# Patient Record
Sex: FEMALE | Race: BLACK OR AFRICAN AMERICAN | Employment: FULL TIME | URBAN - METROPOLITAN AREA
[De-identification: names, ages, dates, MRNs, and addresses within clinical notes are randomized per-mention and may not be internally consistent; named-entity substitution may affect disease eponyms.]

---

## 2018-09-29 ENCOUNTER — HOSPITAL ENCOUNTER (EMERGENCY)
Age: 33
Discharge: HOME | End: 2018-09-29
Payer: MEDICARE

## 2018-09-29 ENCOUNTER — APPOINTMENT (OUTPATIENT)
Dept: CT IMAGING | Age: 33
End: 2018-09-29
Payer: MEDICARE

## 2018-09-29 VITALS
SYSTOLIC BLOOD PRESSURE: 133 MMHG | DIASTOLIC BLOOD PRESSURE: 79 MMHG | RESPIRATION RATE: 16 BRPM | TEMPERATURE: 98.9 F | BODY MASS INDEX: 45.49 KG/M2 | WEIGHT: 265 LBS | OXYGEN SATURATION: 99 % | HEART RATE: 70 BPM

## 2018-09-29 DIAGNOSIS — R10.9 LEFT SIDED ABDOMINAL PAIN: Primary | ICD-10-CM

## 2018-09-29 DIAGNOSIS — R31.9 HEMATURIA, UNSPECIFIED TYPE: ICD-10-CM

## 2018-09-29 LAB
A/G RATIO: 1.2 (ref 1.1–2.2)
ALBUMIN SERPL-MCNC: 4 G/DL (ref 3.4–5)
ALP BLD-CCNC: 74 U/L (ref 40–129)
ALT SERPL-CCNC: 12 U/L (ref 10–40)
ANION GAP SERPL CALCULATED.3IONS-SCNC: 12 MMOL/L (ref 3–16)
AST SERPL-CCNC: 16 U/L (ref 15–37)
BASOPHILS ABSOLUTE: 0 K/UL (ref 0–0.2)
BASOPHILS RELATIVE PERCENT: 0.5 %
BILIRUB SERPL-MCNC: <0.2 MG/DL (ref 0–1)
BILIRUBIN URINE: NEGATIVE
BLOOD, URINE: ABNORMAL
BUN BLDV-MCNC: 11 MG/DL (ref 7–20)
CALCIUM SERPL-MCNC: 9.3 MG/DL (ref 8.3–10.6)
CHLORIDE BLD-SCNC: 102 MMOL/L (ref 99–110)
CLARITY: CLEAR
CO2: 23 MMOL/L (ref 21–32)
COLOR: YELLOW
CREAT SERPL-MCNC: 0.7 MG/DL (ref 0.6–1.1)
EOSINOPHILS ABSOLUTE: 0.2 K/UL (ref 0–0.6)
EOSINOPHILS RELATIVE PERCENT: 2.8 %
EPITHELIAL CELLS, UA: 3 /HPF (ref 0–5)
GFR AFRICAN AMERICAN: >60
GFR NON-AFRICAN AMERICAN: >60
GLOBULIN: 3.4 G/DL
GLUCOSE BLD-MCNC: 86 MG/DL (ref 70–99)
GLUCOSE URINE: NEGATIVE MG/DL
GONADOTROPIN, CHORIONIC (HCG) QUANT: <5 MIU/ML
HCT VFR BLD CALC: 40.6 % (ref 36–48)
HEMOGLOBIN: 13.1 G/DL (ref 12–16)
HYALINE CASTS: 0 /LPF (ref 0–8)
KETONES, URINE: NEGATIVE MG/DL
LEUKOCYTE ESTERASE, URINE: NEGATIVE
LIPASE: 31 U/L (ref 13–60)
LYMPHOCYTES ABSOLUTE: 1.8 K/UL (ref 1–5.1)
LYMPHOCYTES RELATIVE PERCENT: 27.8 %
MCH RBC QN AUTO: 29.1 PG (ref 26–34)
MCHC RBC AUTO-ENTMCNC: 32.3 G/DL (ref 31–36)
MCV RBC AUTO: 90.1 FL (ref 80–100)
MICROSCOPIC EXAMINATION: YES
MONOCYTES ABSOLUTE: 0.8 K/UL (ref 0–1.3)
MONOCYTES RELATIVE PERCENT: 11.9 %
NEUTROPHILS ABSOLUTE: 3.7 K/UL (ref 1.7–7.7)
NEUTROPHILS RELATIVE PERCENT: 57 %
NITRITE, URINE: NEGATIVE
PDW BLD-RTO: 13.6 % (ref 12.4–15.4)
PH UA: 7
PLATELET # BLD: 243 K/UL (ref 135–450)
PMV BLD AUTO: 8.6 FL (ref 5–10.5)
POTASSIUM SERPL-SCNC: 4.5 MMOL/L (ref 3.5–5.1)
PROTEIN UA: NEGATIVE MG/DL
RBC # BLD: 4.5 M/UL (ref 4–5.2)
RBC UA: 10 /HPF (ref 0–4)
SODIUM BLD-SCNC: 137 MMOL/L (ref 136–145)
SPECIFIC GRAVITY UA: >1.03
TOTAL PROTEIN: 7.4 G/DL (ref 6.4–8.2)
URINE REFLEX TO CULTURE: ABNORMAL
URINE TYPE: ABNORMAL
UROBILINOGEN, URINE: 1 E.U./DL
WBC # BLD: 6.5 K/UL (ref 4–11)
WBC UA: 1 /HPF (ref 0–5)

## 2018-09-29 PROCEDURE — 80053 COMPREHEN METABOLIC PANEL: CPT

## 2018-09-29 PROCEDURE — 2580000003 HC RX 258: Performed by: PHYSICIAN ASSISTANT

## 2018-09-29 PROCEDURE — 84702 CHORIONIC GONADOTROPIN TEST: CPT

## 2018-09-29 PROCEDURE — 6370000000 HC RX 637 (ALT 250 FOR IP): Performed by: PHYSICIAN ASSISTANT

## 2018-09-29 PROCEDURE — 6360000004 HC RX CONTRAST MEDICATION: Performed by: PHYSICIAN ASSISTANT

## 2018-09-29 PROCEDURE — 74177 CT ABD & PELVIS W/CONTRAST: CPT

## 2018-09-29 PROCEDURE — 99284 EMERGENCY DEPT VISIT MOD MDM: CPT

## 2018-09-29 PROCEDURE — 96375 TX/PRO/DX INJ NEW DRUG ADDON: CPT

## 2018-09-29 PROCEDURE — 85025 COMPLETE CBC W/AUTO DIFF WBC: CPT

## 2018-09-29 PROCEDURE — 36415 COLL VENOUS BLD VENIPUNCTURE: CPT

## 2018-09-29 PROCEDURE — 6360000002 HC RX W HCPCS: Performed by: PHYSICIAN ASSISTANT

## 2018-09-29 PROCEDURE — 81001 URINALYSIS AUTO W/SCOPE: CPT

## 2018-09-29 PROCEDURE — 96374 THER/PROPH/DIAG INJ IV PUSH: CPT

## 2018-09-29 PROCEDURE — 83690 ASSAY OF LIPASE: CPT

## 2018-09-29 RX ORDER — ONDANSETRON 4 MG/1
4 TABLET, FILM COATED ORAL EVERY 8 HOURS PRN
Qty: 15 TABLET | Refills: 0 | Status: SHIPPED | OUTPATIENT
Start: 2018-09-29 | End: 2020-01-12 | Stop reason: ALTCHOICE

## 2018-09-29 RX ORDER — ONDANSETRON 2 MG/ML
4 INJECTION INTRAMUSCULAR; INTRAVENOUS ONCE
Status: COMPLETED | OUTPATIENT
Start: 2018-09-29 | End: 2018-09-29

## 2018-09-29 RX ORDER — DICYCLOMINE HYDROCHLORIDE 10 MG/1
10 CAPSULE ORAL ONCE
Status: COMPLETED | OUTPATIENT
Start: 2018-09-29 | End: 2018-09-29

## 2018-09-29 RX ORDER — DICYCLOMINE HYDROCHLORIDE 10 MG/1
10 CAPSULE ORAL 4 TIMES DAILY PRN
Qty: 20 CAPSULE | Refills: 0 | Status: SHIPPED | OUTPATIENT
Start: 2018-09-29 | End: 2020-01-12

## 2018-09-29 RX ORDER — KETOROLAC TROMETHAMINE 30 MG/ML
30 INJECTION, SOLUTION INTRAMUSCULAR; INTRAVENOUS ONCE
Status: COMPLETED | OUTPATIENT
Start: 2018-09-29 | End: 2018-09-29

## 2018-09-29 RX ORDER — 0.9 % SODIUM CHLORIDE 0.9 %
1000 INTRAVENOUS SOLUTION INTRAVENOUS ONCE
Status: COMPLETED | OUTPATIENT
Start: 2018-09-29 | End: 2018-09-29

## 2018-09-29 RX ADMIN — SODIUM CHLORIDE 1000 ML: 9 INJECTION, SOLUTION INTRAVENOUS at 18:28

## 2018-09-29 RX ADMIN — ONDANSETRON 4 MG: 2 INJECTION INTRAMUSCULAR; INTRAVENOUS at 18:28

## 2018-09-29 RX ADMIN — DICYCLOMINE HYDROCHLORIDE 10 MG: 10 CAPSULE ORAL at 18:33

## 2018-09-29 RX ADMIN — IOPAMIDOL 75 ML: 755 INJECTION, SOLUTION INTRAVENOUS at 17:59

## 2018-09-29 RX ADMIN — KETOROLAC TROMETHAMINE 30 MG: 30 INJECTION, SOLUTION INTRAMUSCULAR at 18:33

## 2018-09-29 ASSESSMENT — PAIN SCALES - GENERAL
PAINLEVEL_OUTOF10: 9
PAINLEVEL_OUTOF10: 7

## 2018-09-30 NOTE — ED PROVIDER NOTES
Triage Chief Complaint:   Abdominal Pain (Patient c/o pain on and off this week with nausea. patient also states left arm doesnt feel right and she feels dizzy when she bends over. )    [place]:  Ana María Gonsalez is a 28 y.o. female that presents with abdominal pain and nausea. Patient reports that symptoms have been going on for approximately one week. Seems to be intermittent and has gotten worse today. Pain is in the left upper quadrant and also the suprapubic region. Having nausea but no vomiting. Denies any urinary symptoms. Denies any recent travel or sick contacts. Has not taken any medication for her pain or other symptoms. Rates her pain as 7/10. Nothing seems to make it better or worse. Denies fever, chills, cough, shortness of breath, chest pain, vomiting, urinary symptoms, joint pain, numbness, tingling, weakness or associated symptoms. ROS:  At least 10 systems reviewed and otherwise negative except as in the [address] PAST MEDICAL HISTORY/SURGICAL HISTORY    Past Medical History:   Diagnosis Date    Anemia     taking iron    UTI (lower urinary tract infection)      Past Surgical History:   Procedure Laterality Date    ADENOIDECTOMY       SECTION      NRFHT    LEG SURGERY      bone taken from right hip to surgically repair fracture to RLE.    TONSILLECTOMY         CURRENT MEDICATIONS    Current Outpatient Rx   Medication Sig Dispense Refill    dicyclomine (BENTYL) 10 MG capsule Take 1 capsule by mouth 4 times daily as needed (abdominal pain) 20 capsule 0    ondansetron (ZOFRAN) 4 MG tablet Take 1 tablet by mouth every 8 hours as needed for Nausea 15 tablet 0    norgestimate-ethinyl estradiol (ORTHO-CYCLEN, 28,) 0.25-35 MG-MCG per tablet Take 1 tablet by mouth daily. 1 packet 3    ibuprofen (IBU) 800 MG tablet Take 1 tablet by mouth every 8 hours as needed for Pain. 120 tablet 3    ferrous sulfate (FE TABS) 325 (65 FE) MG EC tablet Take 1 tablet by mouth 2 times daily. Medication List as of 9/29/2018  8:08 PM      START taking these medications    Details   dicyclomine (BENTYL) 10 MG capsule Take 1 capsule by mouth 4 times daily as needed (abdominal pain), Disp-20 capsule, R-0Print      ondansetron (ZOFRAN) 4 MG tablet Take 1 tablet by mouth every 8 hours as needed for Nausea, Disp-15 tablet, R-0Print             (Please note that portions of this note may have been completed with a voice recognition program. Efforts were made to edit the dictations but occasionally words are mis-transcribed.)         Kaitlynn Marie PA-C  09/29/18 [id number]

## 2020-01-12 ENCOUNTER — HOSPITAL ENCOUNTER (EMERGENCY)
Age: 35
Discharge: HOME | End: 2020-01-12
Attending: EMERGENCY MEDICINE
Payer: COMMERCIAL

## 2020-01-12 ENCOUNTER — APPOINTMENT (OUTPATIENT)
Dept: GENERAL RADIOLOGY | Age: 35
End: 2020-01-12
Payer: COMMERCIAL

## 2020-01-12 VITALS
OXYGEN SATURATION: 99 % | HEART RATE: 98 BPM | BODY MASS INDEX: 45.24 KG/M2 | RESPIRATION RATE: 16 BRPM | DIASTOLIC BLOOD PRESSURE: 66 MMHG | SYSTOLIC BLOOD PRESSURE: 116 MMHG | TEMPERATURE: 97.9 F | WEIGHT: 265 LBS | HEIGHT: 64 IN

## 2020-01-12 PROCEDURE — 96375 TX/PRO/DX INJ NEW DRUG ADDON: CPT

## 2020-01-12 PROCEDURE — 71046 X-RAY EXAM CHEST 2 VIEWS: CPT

## 2020-01-12 PROCEDURE — 2580000003 HC RX 258: Performed by: PHYSICIAN ASSISTANT

## 2020-01-12 PROCEDURE — 99283 EMERGENCY DEPT VISIT LOW MDM: CPT

## 2020-01-12 PROCEDURE — 6360000002 HC RX W HCPCS: Performed by: PHYSICIAN ASSISTANT

## 2020-01-12 PROCEDURE — 96361 HYDRATE IV INFUSION ADD-ON: CPT

## 2020-01-12 PROCEDURE — 96374 THER/PROPH/DIAG INJ IV PUSH: CPT

## 2020-01-12 RX ORDER — DIPHENHYDRAMINE HYDROCHLORIDE 50 MG/ML
12.5 INJECTION INTRAMUSCULAR; INTRAVENOUS ONCE
Status: COMPLETED | OUTPATIENT
Start: 2020-01-12 | End: 2020-01-12

## 2020-01-12 RX ORDER — KETOROLAC TROMETHAMINE 30 MG/ML
30 INJECTION, SOLUTION INTRAMUSCULAR; INTRAVENOUS ONCE
Status: COMPLETED | OUTPATIENT
Start: 2020-01-12 | End: 2020-01-12

## 2020-01-12 RX ORDER — METOCLOPRAMIDE HYDROCHLORIDE 5 MG/ML
10 INJECTION INTRAMUSCULAR; INTRAVENOUS ONCE
Status: COMPLETED | OUTPATIENT
Start: 2020-01-12 | End: 2020-01-12

## 2020-01-12 RX ORDER — 0.9 % SODIUM CHLORIDE 0.9 %
1000 INTRAVENOUS SOLUTION INTRAVENOUS ONCE
Status: COMPLETED | OUTPATIENT
Start: 2020-01-12 | End: 2020-01-12

## 2020-01-12 RX ADMIN — METOCLOPRAMIDE 10 MG: 5 INJECTION, SOLUTION INTRAMUSCULAR; INTRAVENOUS at 09:27

## 2020-01-12 RX ADMIN — DIPHENHYDRAMINE HYDROCHLORIDE 12.5 MG: 50 INJECTION, SOLUTION INTRAMUSCULAR; INTRAVENOUS at 09:27

## 2020-01-12 RX ADMIN — KETOROLAC TROMETHAMINE 30 MG: 30 INJECTION, SOLUTION INTRAMUSCULAR at 09:28

## 2020-01-12 RX ADMIN — SODIUM CHLORIDE 1000 ML: 9 INJECTION, SOLUTION INTRAVENOUS at 09:27

## 2020-01-12 ASSESSMENT — PAIN DESCRIPTION - LOCATION: LOCATION: HEAD

## 2020-01-12 ASSESSMENT — PAIN SCALES - GENERAL
PAINLEVEL_OUTOF10: 5
PAINLEVEL_OUTOF10: 9

## 2020-01-12 ASSESSMENT — ENCOUNTER SYMPTOMS
COUGH: 1
ABDOMINAL PAIN: 0
DIARRHEA: 0
NAUSEA: 0
SHORTNESS OF BREATH: 0
VOMITING: 0
RHINORRHEA: 0

## 2020-01-12 ASSESSMENT — PAIN DESCRIPTION - PAIN TYPE: TYPE: ACUTE PAIN

## 2020-01-12 ASSESSMENT — PAIN DESCRIPTION - PROGRESSION: CLINICAL_PROGRESSION: GRADUALLY IMPROVING

## 2020-01-12 NOTE — LETTER
[facility] Emergency [facility]  [address] [place] [address], [address]             January 12, 2020    Patient: Harmony Chen   YOB: 1985   Date of Visit: 1/12/2020       To Whom It May Concern:    Qian Martin was seen and treated in our emergency department on 1/12/2020. She is excused from work 1/13/2020. May return 1/14/2020 without restrictions.       Sincerely,         Dr Derick Jarquin

## 2020-01-12 NOTE — ED PROVIDER NOTES
or any disagreements were addressed in the HPI. REVIEW OF SYSTEMS    (2-9 systems for level 4, 10 or more for level 5)     Review of Systems   Constitutional: Negative for activity change, appetite change, chills and fever. HENT: Positive for congestion. Negative for rhinorrhea. Eyes: Negative for visual disturbance. Respiratory: Positive for cough. Negative for shortness of breath. Cardiovascular: Negative for chest pain. Gastrointestinal: Negative for abdominal pain, diarrhea, nausea and vomiting. Genitourinary: Negative for difficulty urinating, dysuria and hematuria. Neurological: Positive for headaches. Negative for weakness and numbness. Positives and Pertinent negatives as per HPI. Except as noted above in the ROS, all other systems were reviewed and negative. PAST MEDICAL HISTORY     Past Medical History:   Diagnosis Date    Anemia     taking iron    UTI (lower urinary tract infection)          SURGICAL HISTORY     Past Surgical History:   Procedure Laterality Date    ADENOIDECTOMY       SECTION      NRFHT    LEG SURGERY      bone taken from right hip to surgically repair fracture to RLE.   [facility]       Discharge Medication List as of 2020 11:26 AM      CONTINUE these medications which have NOT CHANGED    Details   levonorgestrel (MIRENA, 52 MG,) IUD 52 mg 1 each by Intrauterine route once, Intrauterine, ONCE, Historical Med      ferrous sulfate (FE TABS) 325 (65 FE) MG EC tablet Take 1 tablet by mouth 2 times daily. , Disp-60 tablet, R-11               ALLERGIES     Patient has no known allergies.     FAMILYHISTORY       Family History   Problem Relation Age of Onset    Asthma Mother     Diabetes Maternal Aunt     High Blood Pressure Maternal Aunt     Diabetes Maternal Grandmother           SOCIAL HISTORY       Social History     Tobacco Use    Smoking status: Never Smoker    Smokeless tobacco: Never Used Substance Use Topics    Alcohol use: No     Comment: occas    Drug use: No       SCREENINGS             PHYSICAL EXAM    (up to 7 for level 4, 8 or more for level 5)     ED Triage Vitals [01/12/20 0831]   BP Temp Temp Source Pulse Resp SpO2 Height Weight   116/66 97.9 °F (36.6 °C) Oral 98 16 99 % 5' 4\" (1.626 m) 265 lb (120.2 kg)       Physical Exam  Vitals signs and nursing note reviewed. Constitutional:       Appearance: She is well-developed. She is not diaphoretic. HENT:      Head: Normocephalic and atraumatic. Right Ear: External ear normal.      Left Ear: External ear normal.      Nose: Nose normal.      Mouth/Throat:      Mouth: Mucous membranes are moist.      Pharynx: Oropharynx is clear. No posterior oropharyngeal erythema. Eyes:      General:         Right eye: No discharge. Left eye: No discharge. Extraocular Movements: Extraocular movements intact. Conjunctiva/sclera: Conjunctivae normal.      Pupils: Pupils are equal, round, and reactive to light. Neck:      Musculoskeletal: Normal range of motion and neck supple. Trachea: No tracheal deviation. Cardiovascular:      Rate and Rhythm: Normal rate and regular rhythm. Heart sounds: No murmur. Pulmonary:      Effort: Pulmonary effort is normal. No respiratory distress. Breath sounds: Normal breath sounds. No wheezing or rales. Abdominal:      General: Bowel sounds are normal. There is no distension. Palpations: Abdomen is soft. Tenderness: There is no tenderness. There is no guarding. Musculoskeletal: Normal range of motion. Skin:     General: Skin is warm and dry. Neurological:      Mental Status: She is alert and oriented to person, place, and time. GCS: GCS eye subscore is 4. GCS verbal subscore is 5. GCS motor subscore is 6. Cranial Nerves: Cranial nerves are intact. No cranial nerve deficit. Sensory: Sensation is intact. No sensory deficit.       Motor: Motor function is intact. No weakness or abnormal muscle tone. Coordination: Coordination is intact. Gait: Gait is intact. Psychiatric:         Behavior: Behavior normal.         DIAGNOSTIC RESULTS   LABS:    Labs Reviewed - No data to display    All other labs were within normal range or not returned as of this dictation. EKG: All EKG's are interpreted by the Emergency Department Physician in the absence of a cardiologist.  Please see their note for interpretation of EKG. RADIOLOGY:   Non-plain film images such as CT, Ultrasound and MRI are read by the radiologist. Plain radiographic images are visualized and preliminarily interpreted by the  ED Provider with the below findings:        Interpretation per the Radiologist below, if available at the time of this note:    XR CHEST STANDARD (2 VW)   Final Result   No evidence of acute cardiopulmonary disease. No results found. PROCEDURES   Unless otherwise noted below, none     Procedures    CRITICAL CARE TIME   N/A    CONSULTS:  None      EMERGENCY DEPARTMENT COURSE and DIFFERENTIAL DIAGNOSIS/MDM:   Vitals:    Vitals:    01/12/20 0831   BP: 116/66   Pulse: 98   Resp: 16   Temp: 97.9 °F (36.6 °C)   TempSrc: Oral   SpO2: 99%   Weight: 265 lb (120.2 kg)   Height: 5' 4\" (1.626 m)       Patient was given thefollowing medications:  Medications   0.9 % sodium chloride bolus (0 mLs Intravenous Stopped 1/12/20 1026)   ketorolac (TORADOL) injection 30 mg (30 mg Intravenous Given 1/12/20 6376)   metoclopramide (REGLAN) injection 10 mg (10 mg Intravenous Given 1/12/20 5921)   diphenhydrAMINE (BENADRYL) injection 12.5 mg (12.5 mg Intravenous Given 1/12/20 2719)       The patient presents to the emergency department today for evaluation for a headache. The patient states that she had a gradual onset of a throbbing frontal headache 3 days ago. The patient states that her headache has been constant since that time. She is currently rating her pain as a 9/10. effusion, pneumothorax, acute restorative stress or other emergent etiology      FINAL IMPRESSION      1. Acute nonintractable headache, unspecified headache type    2.  Acute upper respiratory infection          DISPOSITION/PLAN   DISPOSITION Decision To Discharge 01/12/2020 10:20:46 AM      PATIENT REFERREDTO:  [facility]) Pre-Services  [phone]  Schedule an appointment as soon as possible for a visit in 2 days      [facility] Emergency Department  [address] [facility]  [phone]    As needed, If symptoms worsen      DISCHARGE MEDICATIONS:  Discharge Medication List as of 1/12/2020 11:26 AM          DISCONTINUED MEDICATIONS:  Discharge Medication List as of 1/12/2020 11:26 AM      STOP taking these medications       dicyclomine (BENTYL) 10 MG capsule Comments:   Reason for Stopping:         norgestimate-ethinyl estradiol (ORTHO-CYCLEN, 28,) 0.25-35 MG-MCG per tablet Comments:   Reason for Stopping:         Prenatal Vit-Fe Fumarate-FA (PRENATAL MULTIVITAMIN) 27-1 MG TABS tablet Comments:   Reason for Stopping:                      (Please note that portions of this note were completed with a voice recognition program.  Efforts were made to edit the dictations but occasionally words are mis-transcribed.)    Panchito Prieto PA-C (electronically signed)            Panchito Prieto PA-C  01/12/20 9792

## 2020-01-12 NOTE — ED PROVIDER NOTES
[facility] Emergency Department      Pt Name: Eun Mcghee  MRN: [id number]  [place] 1985  Date of evaluation: 2020  Provider: Zohaib Corea MD  I independently performed a history and physical on Rosalee Blake. All diagnostic, treatment, and disposition decisions were made by myself in conjunction with the advanced practice provider. HPI: Eun Mcghee presented with   Chief Complaint   Patient presents with    Headache     WITH A COUGH, SORE THROAT, RUNNY NOSE FOR PAST FEW DAYS     Eun Mcghee has a past medical history of Anemia and UTI (lower urinary tract infection). She has a past surgical history that includes Tonsillectomy; Adenoidectomy; Leg Surgery (); and  section. No current facility-administered medications on file prior to encounter. Current Outpatient Medications on File Prior to Encounter   Medication Sig Dispense Refill    levonorgestrel (MIRENA, 52 MG,) IUD 52 mg 1 each by Intrauterine route once      ferrous sulfate (FE TABS) 325 (65 FE) MG EC tablet Take 1 tablet by mouth 2 times daily. 60 tablet 11     PHYSICAL EXAM  Vitals: /66   Pulse 98   Temp 97.9 °F (36.6 °C) (Oral)   Resp 16   Ht 5' 4\" (1.626 m)   Wt 265 lb (120.2 kg)   SpO2 99%   BMI 45.49 kg/m²   Constitutional:  29 y.o. female alert  HENT:  Atraumatic, oral mucosa moist  Neck:  No visible JVD, supple  Chest/Lungs:  Respiratory effort normal, clear, regular  Abdomen:  Non-distended  Back:  No gross deformity  Extremities:  Normal tone and perfusion  Neurologic:  Alert, speech normal, mentation normal, pupils equal, normal coordination of extremities, no facial asymmetry, gait is normal    Medical Decision Making and Plan: Briefly, this is an 29 y. o.female who presented with headache, cough and congestion for about a week. She received headache medicine. The patient is feeling improved.   She does not have any clinical exam findings that would suggest need for emergent brain imaging or CSF studies. We do not feel the headache symptoms reflect a more emergent condition such as meningitis, encephalitis, ICH, sentinel bleed from ruptured aneurysm, thrombosis, TIA, pseudotumor cerebri, temporal arteritis, hypertensive urgency or emergency, acute angle glaucoma, tumor with mass effect, sinus abscess, mastoiditis, shingles, trigeminal neuralgia, CO poisoning, etc.  Cheyenne Dewey was given appropriate discharge instructions. Referral to follow up provider. For further details of Via Dontrell Blake's Emergency Department encounter, please see documentation by advanced practice provider AGUSTIN Deng. RADIOLOGY:     Plain x-rays were viewed by me:   XR CHEST STANDARD (2 VW)   Final Result   No evidence of acute cardiopulmonary disease. Medications administered:  Medications   0.9 % sodium chloride bolus (0 mLs Intravenous Stopped 1/12/20 1029)   ketorolac (TORADOL) injection 30 mg (30 mg Intravenous Given 1/12/20 0928)   metoclopramide (REGLAN) injection 10 mg (10 mg Intravenous Given 1/12/20 0927)   diphenhydrAMINE (BENADRYL) injection 12.5 mg (12.5 mg Intravenous Given 1/12/20 0927)     FOLLOW UP:    [facility]  [phone]  Schedule an appointment as soon as possible for a visit in 2 days      [facility] Emergency Department  [address] [place] [address]  [phone]    As needed, If symptoms worsen    FINAL IMPRESSION:    1. Acute nonintractable headache, unspecified headache type    2.  Acute upper respiratory infection            Priscila Guevara MD  01/12/20 5999

## 2020-03-09 ENCOUNTER — OFFICE VISIT (OUTPATIENT)
Dept: INTERNAL MEDICINE CLINIC | Age: 35
End: 2020-03-09
Payer: COMMERCIAL

## 2020-03-09 VITALS
OXYGEN SATURATION: 98 % | SYSTOLIC BLOOD PRESSURE: 128 MMHG | HEIGHT: 64 IN | BODY MASS INDEX: 46.61 KG/M2 | TEMPERATURE: 98.6 F | RESPIRATION RATE: 16 BRPM | HEART RATE: 86 BPM | DIASTOLIC BLOOD PRESSURE: 74 MMHG | WEIGHT: 273 LBS

## 2020-03-09 LAB
A/G RATIO: 1.4 (ref 1.1–2.2)
ALBUMIN SERPL-MCNC: 4.2 G/DL (ref 3.4–5)
ALP BLD-CCNC: 75 U/L (ref 40–129)
ALT SERPL-CCNC: 15 U/L (ref 10–40)
ANION GAP SERPL CALCULATED.3IONS-SCNC: 11 MMOL/L (ref 3–16)
AST SERPL-CCNC: 17 U/L (ref 15–37)
BILIRUB SERPL-MCNC: 0.4 MG/DL (ref 0–1)
BUN BLDV-MCNC: 11 MG/DL (ref 7–20)
CALCIUM SERPL-MCNC: 9.4 MG/DL (ref 8.3–10.6)
CHLORIDE BLD-SCNC: 105 MMOL/L (ref 99–110)
CHOLESTEROL, TOTAL: 192 MG/DL (ref 0–199)
CO2: 25 MMOL/L (ref 21–32)
CREAT SERPL-MCNC: 0.7 MG/DL (ref 0.6–1.1)
GFR AFRICAN AMERICAN: >60
GFR NON-AFRICAN AMERICAN: >60
GLOBULIN: 3 G/DL
GLUCOSE BLD-MCNC: 82 MG/DL (ref 70–99)
HDLC SERPL-MCNC: 64 MG/DL (ref 40–60)
LDL CHOLESTEROL CALCULATED: 118 MG/DL
POTASSIUM SERPL-SCNC: 4.2 MMOL/L (ref 3.5–5.1)
SODIUM BLD-SCNC: 141 MMOL/L (ref 136–145)
TOTAL PROTEIN: 7.2 G/DL (ref 6.4–8.2)
TRIGL SERPL-MCNC: 50 MG/DL (ref 0–150)
TSH SERPL DL<=0.05 MIU/L-ACNC: 3.01 UIU/ML (ref 0.27–4.2)
VITAMIN D 25-HYDROXY: 8.2 NG/ML
VLDLC SERPL CALC-MCNC: 10 MG/DL

## 2020-03-09 PROCEDURE — 99204 OFFICE O/P NEW MOD 45 MIN: CPT | Performed by: INTERNAL MEDICINE

## 2020-03-09 RX ORDER — ASCORBIC ACID 500 MG
500 TABLET ORAL DAILY
Qty: 30 TABLET | Refills: 3 | Status: SHIPPED | OUTPATIENT
Start: 2020-03-09

## 2020-03-09 RX ORDER — CALCIUM CARBONATE 300MG(750)
400 TABLET,CHEWABLE ORAL DAILY
Qty: 90 TABLET | Refills: 1 | Status: SHIPPED | OUTPATIENT
Start: 2020-03-09 | End: 2020-07-29 | Stop reason: ALTCHOICE

## 2020-03-09 RX ORDER — TOPIRAMATE 25 MG/1
TABLET ORAL
Qty: 120 TABLET | Refills: 2 | Status: SHIPPED | OUTPATIENT
Start: 2020-03-09 | End: 2020-11-09 | Stop reason: ALTCHOICE

## 2020-03-09 SDOH — SOCIAL STABILITY: SOCIAL NETWORK
IN A TYPICAL WEEK, HOW MANY TIMES DO YOU TALK ON THE PHONE WITH FAMILY, FRIENDS, OR NEIGHBORS?: MORE THAN THREE TIMES A WEEK

## 2020-03-09 SDOH — HEALTH STABILITY: MENTAL HEALTH
STRESS IS WHEN SOMEONE FEELS TENSE, NERVOUS, ANXIOUS, OR CAN'T SLEEP AT NIGHT BECAUSE THEIR MIND IS TROUBLED. HOW STRESSED ARE YOU?: VERY MUCH

## 2020-03-09 SDOH — SOCIAL STABILITY: SOCIAL NETWORK
DO YOU BELONG TO ANY CLUBS OR ORGANIZATIONS SUCH AS CHURCH GROUPS UNIONS, FRATERNAL OR ATHLETIC GROUPS, OR SCHOOL GROUPS?: YES

## 2020-03-09 SDOH — SOCIAL STABILITY: SOCIAL INSECURITY
WITHIN THE LAST YEAR, HAVE YOU BEEN KICKED, HIT, SLAPPED, OR OTHERWISE PHYSICALLY HURT BY YOUR PARTNER OR EX-PARTNER?: NO

## 2020-03-09 SDOH — SOCIAL STABILITY: SOCIAL NETWORK: HOW OFTEN DO YOU ATTEND CHURCH OR RELIGIOUS SERVICES?: 1 TO 4 TIMES PER YEAR

## 2020-03-09 SDOH — ECONOMIC STABILITY: TRANSPORTATION INSECURITY
IN THE PAST 12 MONTHS, HAS THE LACK OF TRANSPORTATION KEPT YOU FROM MEDICAL APPOINTMENTS OR FROM GETTING MEDICATIONS?: YES

## 2020-03-09 SDOH — SOCIAL STABILITY: SOCIAL NETWORK: ARE YOU MARRIED, WIDOWED, DIVORCED, SEPARATED, NEVER MARRIED, OR LIVING WITH A PARTNER?: MARRIED

## 2020-03-09 SDOH — ECONOMIC STABILITY: TRANSPORTATION INSECURITY
IN THE PAST 12 MONTHS, HAS LACK OF TRANSPORTATION KEPT YOU FROM MEETINGS, WORK, OR FROM GETTING THINGS NEEDED FOR DAILY LIVING?: YES

## 2020-03-09 SDOH — SOCIAL STABILITY: SOCIAL NETWORK: HOW OFTEN DO YOU GET TOGETHER WITH FRIENDS OR RELATIVES?: MORE THAN THREE TIMES A WEEK

## 2020-03-09 SDOH — SOCIAL STABILITY: SOCIAL INSECURITY
WITHIN THE LAST YEAR, HAVE TO BEEN RAPED OR FORCED TO HAVE ANY KIND OF SEXUAL ACTIVITY BY YOUR PARTNER OR EX-PARTNER?: NO

## 2020-03-09 SDOH — HEALTH STABILITY: PHYSICAL HEALTH: ON AVERAGE, HOW MANY DAYS PER WEEK DO YOU ENGAGE IN MODERATE TO STRENUOUS EXERCISE (LIKE A BRISK WALK)?: 0 DAYS

## 2020-03-09 SDOH — ECONOMIC STABILITY: FOOD INSECURITY: WITHIN THE PAST 12 MONTHS, YOU WORRIED THAT YOUR FOOD WOULD RUN OUT BEFORE YOU GOT MONEY TO BUY MORE.: SOMETIMES TRUE

## 2020-03-09 SDOH — SOCIAL STABILITY: SOCIAL INSECURITY: WITHIN THE LAST YEAR, HAVE YOU BEEN AFRAID OF YOUR PARTNER OR EX-PARTNER?: NO

## 2020-03-09 SDOH — ECONOMIC STABILITY: INCOME INSECURITY: HOW HARD IS IT FOR YOU TO PAY FOR THE VERY BASICS LIKE FOOD, HOUSING, MEDICAL CARE, AND HEATING?: SOMEWHAT HARD

## 2020-03-09 SDOH — ECONOMIC STABILITY: FOOD INSECURITY: WITHIN THE PAST 12 MONTHS, THE FOOD YOU BOUGHT JUST DIDN'T LAST AND YOU DIDN'T HAVE MONEY TO GET MORE.: SOMETIMES TRUE

## 2020-03-09 SDOH — SOCIAL STABILITY: SOCIAL NETWORK: HOW OFTEN DO YOU ATTENT MEETINGS OF THE CLUB OR ORGANIZATION YOU BELONG TO?: 1 TO 4 TIMES PER YEAR

## 2020-03-09 SDOH — HEALTH STABILITY: PHYSICAL HEALTH: ON AVERAGE, HOW MANY MINUTES DO YOU ENGAGE IN EXERCISE AT THIS LEVEL?: 0 MIN

## 2020-03-09 SDOH — SOCIAL STABILITY: SOCIAL INSECURITY: WITHIN THE LAST YEAR, HAVE YOU BEEN HUMILIATED OR EMOTIONALLY ABUSED IN OTHER WAYS BY YOUR PARTNER OR EX-PARTNER?: NO

## 2020-03-09 ASSESSMENT — ENCOUNTER SYMPTOMS
PHOTOPHOBIA: 1
BOWEL INCONTINENCE: 0
NAUSEA: 1
COUGH: 0
EYE WATERING: 0
BLURRED VISION: 0
ABDOMINAL PAIN: 0
EYE PAIN: 0
EYE REDNESS: 0
SWOLLEN GLANDS: 0
SINUS PRESSURE: 0
FACIAL SWEATING: 0
SORE THROAT: 0
SCALP TENDERNESS: 0
VISUAL CHANGE: 0
VOMITING: 0
BACK PAIN: 1
RHINORRHEA: 0

## 2020-03-09 ASSESSMENT — PATIENT HEALTH QUESTIONNAIRE - PHQ9
SUM OF ALL RESPONSES TO PHQ9 QUESTIONS 1 & 2: 0
SUM OF ALL RESPONSES TO PHQ QUESTIONS 1-9: 0
SUM OF ALL RESPONSES TO PHQ QUESTIONS 1-9: 0
1. LITTLE INTEREST OR PLEASURE IN DOING THINGS: 0
2. FEELING DOWN, DEPRESSED OR HOPELESS: 0

## 2020-03-09 NOTE — PATIENT INSTRUCTIONS
Topamax 25 mg tab. One tab at bedtime for one week then titrate by 25 MG increments every 7 days up to 50 mg twice a day. Side-effects of topamax include but not limited to suicidal risk, cognitive side effects, weight loss, sedation, nausea, altered taste, risk of kidney stones, glaucoma, paresthesias in the extremities. To minimize the paresthesias with Topamax, take Vitamin C 500 mg twice a day. Patient Education        Low Back Pain: Exercises  Introduction  Here are some examples of exercises for you to try. The exercises may be suggested for a condition or for rehabilitation. Start each exercise slowly. Ease off the exercises if you start to have pain. You will be told when to start these exercises and which ones will work best for you. How to do the exercises  Press-up   1. Lie on your stomach, supporting your body with your forearms. 2. Press your elbows down into the floor to raise your upper back. As you do this, relax your stomach muscles and allow your back to arch without using your back muscles. As your press up, do not let your hips or pelvis come off the floor. 3. Hold for 15 to 30 seconds, then relax. 4. Repeat 2 to 4 times. Alternate arm and leg (bird dog) exercise   1. Start on the floor, on your hands and knees. 2. Tighten your belly muscles. 3. Raise one leg off the floor, and hold it straight out behind you. Be careful not to let your hip drop down, because that will twist your trunk. 4. Hold for about 6 seconds, then lower your leg and switch to the other leg. 5. Repeat 8 to 12 times on each leg. 6. Over time, work up to holding for 10 to 30 seconds each time. 7. If you feel stable and secure with your leg raised, try raising the opposite arm straight out in front of you at the same time. Knee-to-chest exercise   1. Lie on your back with your knees bent and your feet flat on the floor.   2. Bring one knee to your chest, keeping the other foot flat on the floor (or keeping the other leg straight, whichever feels better on your lower back). 3. Keep your lower back pressed to the floor. Hold for at least 15 to 30 seconds. 4. Relax, and lower the knee to the starting position. 5. Repeat with the other leg. Repeat 2 to 4 times with each leg. 6. To get more stretch, put your other leg flat on the floor while pulling your knee to your chest.    Curl-ups   1. Lie on the floor on your back with your knees bent at a 90-degree angle. Your feet should be flat on the floor, about 12 inches from your buttocks. 2. Cross your arms over your chest. If this bothers your neck, try putting your hands behind your neck (not your head), with your elbows spread apart. 3. Slowly tighten your belly muscles and raise your shoulder blades off the floor. 4. Keep your head in line with your body, and do not press your chin to your chest.  5. Hold this position for 1 or 2 seconds, then slowly lower yourself back down to the floor. 6. Repeat 8 to 12 times. Pelvic tilt exercise   1. Lie on your back with your knees bent. 2. \"Brace\" your stomach. This means to tighten your muscles by pulling in and imagining your belly button moving toward your spine. You should feel like your back is pressing to the floor and your hips and pelvis are rocking back. 3. Hold for about 6 seconds while you breathe smoothly. 4. Repeat 8 to 12 times. Heel dig bridging   1. Lie on your back with both knees bent and your ankles bent so that only your heels are digging into the floor. Your knees should be bent about 90 degrees. 2. Then push your heels into the floor, squeeze your buttocks, and lift your hips off the floor until your shoulders, hips, and knees are all in a straight line. 3. Hold for about 6 seconds as you continue to breathe normally, and then slowly lower your hips back down to the floor and rest for up to 10 seconds. 4. Do 8 to 12 repetitions. Hamstring stretch in doorway   1.  Lie on your back in a doorway, with one leg through the open door. 2. Slide your leg up the wall to straighten your knee. You should feel a gentle stretch down the back of your leg. 3. Hold the stretch for at least 15 to 30 seconds. Do not arch your back, point your toes, or bend either knee. Keep one heel touching the floor and the other heel touching the wall. 4. Repeat with your other leg. 5. Do 2 to 4 times for each leg. Hip flexor stretch   1. Kneel on the floor with one knee bent and one leg behind you. Place your forward knee over your foot. Keep your other knee touching the floor. 2. Slowly push your hips forward until you feel a stretch in the upper thigh of your rear leg. 3. Hold the stretch for at least 15 to 30 seconds. Repeat with your other leg. 4. Do 2 to 4 times on each side. Wall sit   1. Stand with your back 10 to 12 inches away from a wall. 2. Lean into the wall until your back is flat against it. 3. Slowly slide down until your knees are slightly bent, pressing your lower back into the wall. 4. Hold for about 6 seconds, then slide back up the wall. 5. Repeat 8 to 12 times. Follow-up care is a key part of your treatment and safety. Be sure to make and go to all appointments, and call your doctor if you are having problems. It's also a good idea to know your test results and keep a list of the medicines you take. Where can you learn more? Go to https://[employer]. org and sign in to your [employer] account. Enter T550 in the [facility] box to learn more about \"Low Back Pain: Exercises. \"     If you do not have an account, please click on the \"Sign Up Now\" link. Current as of: June 26, 2019  Content Version: 12.3  © 9581-0376 [employer]. Care instructions adapted under license by [facility] ([facility]).  If you have questions about a medical condition or this instruction, always ask your healthcare professional. Lalo Thompson disclaims any warranty or

## 2020-03-09 NOTE — PROGRESS NOTES
Subjective:      Patient ID: Arash Carreon is a 29 y.o. female. Back Pain   This is a chronic problem. The current episode started 1 to 4 weeks ago. The problem occurs intermittently. The problem has been waxing and waning since onset. The pain is present in the lumbar spine and sacro-iliac. The quality of the pain is described as aching. The pain does not radiate. The pain is at a severity of 3/10. The pain is mild. The symptoms are aggravated by twisting. Associated symptoms include tingling. Pertinent negatives include no abdominal pain, bladder incontinence, bowel incontinence, chest pain, dysuria, fever, headaches, leg pain, numbness, paresis, paresthesias, pelvic pain, perianal numbness, weakness or weight loss. Risk factors include poor posture and sedentary lifestyle. She has tried NSAIDs for the symptoms. The treatment provided no relief. Migraine    This is a recurrent problem. The current episode started more than 1 month ago. The problem occurs intermittently (1-3 times a week/ 10 headaches/month which last ~ ~24 hours ). The problem has been waxing and waning. The pain is located in the right unilateral region. Radiates to: other side of head. The pain quality is similar to prior headaches. The quality of the pain is described as throbbing. The pain is at a severity of 4/10. Associated symptoms include back pain, hearing loss, insomnia, nausea, phonophobia, photophobia and tingling. Pertinent negatives include no abdominal pain, abnormal behavior, anorexia, blurred vision, coughing, dizziness, drainage, ear pain, eye pain, eye redness, eye watering, facial sweating, fever, loss of balance, muscle aches, neck pain, numbness, rhinorrhea, scalp tenderness, seizures, sinus pressure, sore throat, swollen glands, tinnitus, visual change, vomiting, weakness or weight loss.  The symptoms are aggravated by caffeine withdrawal. She has tried Excedrin and NSAIDs (rest and all different types of medicine) for than three times a week     Gets together: More than three times a week     Attends [employer] service: 1 to 4 times per year     Active member of club or organization: Yes     Attends meetings of clubs or organizations: 1 to 4 times per year     Relationship status:     Intimate partner violence     Fear of current or ex partner: No     Emotionally abused: No     Physically abused: No     Forced sexual activity: No   Other Topics Concern    Not on file   Social History Narrative    Lives home with  3 kids and 2 dogs and a lizard. Review of Systems   Constitutional: Negative for fever and weight loss. HENT: Positive for hearing loss. Negative for ear pain, rhinorrhea, sinus pressure, sore throat and tinnitus. Eyes: Positive for photophobia. Negative for blurred vision, pain and redness. Respiratory: Negative for cough. Cardiovascular: Negative for chest pain. Gastrointestinal: Positive for nausea. Negative for abdominal pain, anorexia, bowel incontinence and vomiting. Genitourinary: Negative for bladder incontinence, dysuria and pelvic pain. Musculoskeletal: Positive for back pain. Negative for neck pain. Neurological: Positive for tingling. Negative for dizziness, seizures, weakness, numbness, headaches, paresthesias and loss of balance. Psychiatric/Behavioral: The patient has insomnia. @DOS@    No Known Allergies    Current Outpatient Medications   Medication Sig Dispense Refill    levonorgestrel (MIRENA, 52 MG,) IUD 52 mg 1 each by Intrauterine route once      ferrous sulfate (FE TABS) 325 (65 FE) MG EC tablet Take 1 tablet by mouth 2 times daily. 60 tablet 11     No current facility-administered medications for this visit. Vitals:    03/09/20 1028   BP: 128/74   Pulse: 86   Resp: 16   Temp: 98.6 °F (37 °C)   TempSrc: Oral   SpO2: 98%   Weight: 273 lb (123.8 kg)   Height: 5' 4\" (1.626 m)     Body mass index is 46.86 kg/m².      Wt Readings from Last 3 Encounters:   03/09/20 273 lb (123.8 kg)   01/12/20 265 lb (120.2 kg)   09/29/18 265 lb (120.2 kg)     Weight 97.6 kg (215 lb 3.2 oz) 10/06/2016 11:06 AM EDT       BP Readings from Last 3 Encounters:   03/09/20 128/74   01/12/20 116/66   09/29/18 133/79       Objective:   Physical Exam  Vitals signs and nursing note reviewed. Constitutional:       General: She is not in acute distress. Appearance: Normal appearance. She is well-developed. She is not diaphoretic. HENT:      Head: Normocephalic and atraumatic. Right Ear: Hearing, tympanic membrane, ear canal and external ear normal.      Left Ear: Hearing, tympanic membrane, ear canal and external ear normal.      Nose: Rhinorrhea present. No nasal deformity, laceration or mucosal edema. Right Sinus: No maxillary sinus tenderness or frontal sinus tenderness. Left Sinus: No maxillary sinus tenderness or frontal sinus tenderness. Mouth/Throat:      Pharynx: Uvula midline. No oropharyngeal exudate. Eyes:      General: Lids are normal.         Right eye: No discharge. Left eye: No discharge. Conjunctiva/sclera: Conjunctivae normal.      Pupils: Pupils are equal, round, and reactive to light. Neck:      Musculoskeletal: Full passive range of motion without pain, normal range of motion and neck supple. Thyroid: No thyroid mass or thyromegaly. Vascular: Normal carotid pulses. No carotid bruit, hepatojugular reflux or JVD. Trachea: Trachea and phonation normal.   Cardiovascular:      Rate and Rhythm: Normal rate and regular rhythm. Chest Wall: PMI is not displaced. Pulses: Normal pulses. Carotid pulses are 2+ on the right side and 2+ on the left side. Radial pulses are 2+ on the right side and 2+ on the left side. Heart sounds: Normal heart sounds, S1 normal and S2 normal.   Pulmonary:      Effort: Pulmonary effort is normal. No respiratory distress.       Breath sounds: Normal breath sounds. No decreased breath sounds, wheezing or rhonchi. Abdominal:      General: Bowel sounds are normal. There is no distension. Palpations: Abdomen is soft. There is no mass. Tenderness: There is no abdominal tenderness. There is no guarding or rebound. Comments: No HSM   Musculoskeletal: Normal range of motion. Right shoulder: Normal.      Left shoulder: Normal.      Right hip: Normal.      Left hip: Normal.      Right knee: Normal.      Left knee: Normal.   Lymphadenopathy:      Head:      Right side of head: No submental, submandibular, tonsillar, preauricular, posterior auricular or occipital adenopathy. Left side of head: No submental, submandibular, tonsillar, preauricular, posterior auricular or occipital adenopathy. Cervical: No cervical adenopathy. Right cervical: No superficial, deep or posterior cervical adenopathy. Left cervical: No superficial, deep or posterior cervical adenopathy. Skin:     General: Skin is warm. Nails: There is no clubbing. Neurological:      Mental Status: She is alert and oriented to person, place, and time. GCS: GCS eye subscore is 4. GCS verbal subscore is 5. GCS motor subscore is 6. Cranial Nerves: No cranial nerve deficit. Sensory: No sensory deficit. Deep Tendon Reflexes: Reflexes are normal and symmetric. Reflex Scores:       Tricep reflexes are 2+ on the right side and 2+ on the left side. Bicep reflexes are 2+ on the right side and 2+ on the left side. Psychiatric:         Speech: Speech normal.         Behavior: Behavior normal. Behavior is not slowed. Assessment/Plan:  Stu Haynes was seen today for establish care. Diagnoses and all orders for this visit:    Vitamin D deficiency  -     VITAMIN D 25 HYDROXY; Future    Encounter for health-related screening  -     TSH without Reflex; Future  -     Lipid Panel; Future  -     Comprehensive Metabolic Panel;  Future    Fatigue, unspecified type  -     Cyndy Mckee MD, Sleep Medicine, [facility]    BMI 45.0-49.9, adult [facility])  -     [facility]    Sleep apnea, unspecified type    Migraine without status migrainosus, not intractable, unspecified migraine type  -     Shavonne Amos MD, Sleep Medicine, [facility]  -     vitamin C (ASCORBIC ACID) 500 MG tablet; Take 1 tablet by mouth daily  -     topiramate (TOPAMAX) 25 MG tablet; One PO HS for 1 wk, 2 tabs PO HS for 1 wk, 1 PO AM and 2 PO HS for 1 wk 2 PO BID. -     Magnesium 400 MG TABS; Take 400 mg by mouth daily  -     Riboflavin 100 MG CAPS; Take 100 mg by mouth daily      Return in about 6 weeks (around 4/20/2020) for migraines/fatigue.         Imani Diamond MD

## 2020-05-06 ENCOUNTER — TELEPHONE (OUTPATIENT)
Dept: INTERNAL MEDICINE CLINIC | Age: 35
End: 2020-05-06

## 2020-05-15 LAB
HPV COMMENT: NORMAL
HPV TYPE 16: NOT DETECTED
HPV TYPE 18: NOT DETECTED
HPVOH (OTHER TYPES): NOT DETECTED
ORGANISM: ABNORMAL
URINE CULTURE, ROUTINE: ABNORMAL

## 2020-06-23 ENCOUNTER — OFFICE VISIT (OUTPATIENT)
Dept: BARIATRICS/WEIGHT MGMT | Age: 35
End: 2020-06-23
Payer: COMMERCIAL

## 2020-06-23 VITALS
RESPIRATION RATE: 18 BRPM | SYSTOLIC BLOOD PRESSURE: 131 MMHG | BODY MASS INDEX: 45.82 KG/M2 | OXYGEN SATURATION: 98 % | HEART RATE: 90 BPM | HEIGHT: 65 IN | DIASTOLIC BLOOD PRESSURE: 80 MMHG | WEIGHT: 275 LBS

## 2020-06-23 PROBLEM — G89.29 CHRONIC MIDLINE LOW BACK PAIN WITHOUT SCIATICA: Status: ACTIVE | Noted: 2020-06-23

## 2020-06-23 PROBLEM — K21.9 CHRONIC GERD: Status: ACTIVE | Noted: 2020-06-23

## 2020-06-23 PROBLEM — E66.01 MORBID OBESITY WITH BMI OF 45.0-49.9, ADULT (HCC): Status: ACTIVE | Noted: 2020-06-23

## 2020-06-23 PROBLEM — M54.50 CHRONIC MIDLINE LOW BACK PAIN WITHOUT SCIATICA: Status: ACTIVE | Noted: 2020-06-23

## 2020-06-23 PROBLEM — M17.11 ARTHRITIS OF KNEE, RIGHT: Status: ACTIVE | Noted: 2020-06-23

## 2020-06-23 PROCEDURE — 99204 OFFICE O/P NEW MOD 45 MIN: CPT | Performed by: SURGERY

## 2020-06-23 NOTE — PROGRESS NOTES
[facility]  Joshua Hill MD, [facility], [address] [place] [address]    Jag[place] Zamudio [id number] . Phone: [phone]  Fax: [phone]       Medical Weight Loss Consultation         Chief Complaint   Patient presents with    Obesity     NP, MWFALGUNI, BCDESTINY         HPI:    Tam Smith is a very pleasant 29 y.o. obese female ,   Body mass index is 45.76 kg/m². And multiple medical problems who is presenting via telemedicine for weight loss evaluation and consultation by Dr. Luke Bernal. Patient has been struggling for several years now with obesity. Patient feels the weight is an obstacle to achieve and perform things in  daily living and is posing risk on health. Tries to diet, and exercise but can't keep the weight off. Patient tried  Atkins Diet, Cabbage Soup Diet, grapefruit, low carb and calorie restriction. Patient has participated in meal replacement/liquid diets - Slimfast.  Patient has not participated in weight loss medications and other regimens, but with no sustainable weight loss. Patient  is very determined to lose weight and be healthy, and is interested in joining our weight loss program to achieve this goal.    Otherwise patient denies any nausea, vomiting, fevers, chills, shortness of breath, chest pain, constipation or urinary symptoms.       Pain Assessment   Denies any abdominal pain     Past Medical History:   Diagnosis Date    Anemia     taking iron    Back pain     Preeclampsia     UTI (lower urinary tract infection)     Vitamin D deficiency      Past Surgical History:   Procedure Laterality Date    ADENOIDECTOMY       SECTION      NRFHT    LEG SURGERY      bone taken from right hip to surgically repair fracture to RLE.    TONSILLECTOMY       Family History   Problem Relation Age of Onset    Asthma Mother     Diabetes Mother     Diabetes Maternal Aunt     High Blood Pressure Maternal Aunt     Diabetes Maternal Grandmother     Hypertension Father     Diabetes Father      Social History     Tobacco Use    Smoking status: Never Smoker    Smokeless tobacco: Never Used   Substance Use Topics    Alcohol use: No     Comment: occas     I counseled the patient on the importance of not smoking and risks of ETOH. No Known Allergies  Vitals:    06/23/20 0910   BP: 131/80   Pulse: 90   Resp: 18   SpO2: 98%   Weight: 275 lb (124.7 kg)   Height: 5' 5\" (1.651 m)       Body mass index is 45.76 kg/m². Current Outpatient Medications:     vitamin C (ASCORBIC ACID) 500 MG tablet, Take 1 tablet by mouth daily, Disp: 30 tablet, Rfl: 3    Magnesium 400 MG TABS, Take 400 mg by mouth daily, Disp: 90 tablet, Rfl: 1    levonorgestrel (MIRENA, 52 MG,) IUD 52 mg, 1 each by Intrauterine route once, Disp: , Rfl:     topiramate (TOPAMAX) 25 MG tablet, One PO HS for 1 wk, 2 tabs PO HS for 1 wk, 1 PO AM and 2 PO HS for 1 wk 2 PO BID. (Patient not taking: Reported on 6/23/2020), Disp: 120 tablet, Rfl: 2    Riboflavin 100 MG CAPS, Take 100 mg by mouth daily (Patient not taking: Reported on 6/23/2020), Disp: 90 capsule, Rfl: 3    ferrous sulfate (FE TABS) 325 (65 FE) MG EC tablet, Take 1 tablet by mouth 2 times daily. , Disp: 60 tablet, Rfl: 11      Review of Systems - History obtained from the patient  General ROS: negative  Psychological ROS: negative  Ophthalmic ROS: negative  Neurological ROS: negative  ENT ROS: negative  Allergy and Immunology ROS: negative  Hematological and Lymphatic ROS: negative  Endocrine ROS: negative  Breast ROS: negative  Respiratory ROS: negative  Cardiovascular ROS: negative  Gastrointestinal ROS:negative  Genito-Urinary ROS: negative  Musculoskeletal ROS: joint pain  Skin ROS: negative      Physical Exam   Constitutional: Patient is oriented to person, place, and time. Vital signs are normal. Patient  appears well-developed and well-nourished. Patient  is active and cooperative. Non-toxic appearance.

## 2020-06-27 ENCOUNTER — HOSPITAL ENCOUNTER (OUTPATIENT)
Age: 35
Discharge: HOME | End: 2020-06-27
Payer: COMMERCIAL

## 2020-06-27 LAB
A/G RATIO: 1.3 (ref 1.1–2.2)
ALBUMIN SERPL-MCNC: 4.2 G/DL (ref 3.4–5)
ALP BLD-CCNC: 78 U/L (ref 40–129)
ALT SERPL-CCNC: 14 U/L (ref 10–40)
ANION GAP SERPL CALCULATED.3IONS-SCNC: 13 MMOL/L (ref 3–16)
AST SERPL-CCNC: 15 U/L (ref 15–37)
BASOPHILS ABSOLUTE: 0 K/UL (ref 0–0.2)
BASOPHILS RELATIVE PERCENT: 0.8 %
BILIRUB SERPL-MCNC: 0.5 MG/DL (ref 0–1)
BUN BLDV-MCNC: 12 MG/DL (ref 7–20)
CALCIUM SERPL-MCNC: 9.5 MG/DL (ref 8.3–10.6)
CHLORIDE BLD-SCNC: 103 MMOL/L (ref 99–110)
CHOLESTEROL, TOTAL: 221 MG/DL (ref 0–199)
CO2: 22 MMOL/L (ref 21–32)
CREAT SERPL-MCNC: 0.7 MG/DL (ref 0.6–1.1)
EOSINOPHILS ABSOLUTE: 0.1 K/UL (ref 0–0.6)
EOSINOPHILS RELATIVE PERCENT: 2.5 %
FOLATE: 10.19 NG/ML (ref 4.78–24.2)
GFR AFRICAN AMERICAN: >60
GFR NON-AFRICAN AMERICAN: >60
GLOBULIN: 3.3 G/DL
GLUCOSE BLD-MCNC: 70 MG/DL (ref 70–99)
HCT VFR BLD CALC: 41.4 % (ref 36–48)
HDLC SERPL-MCNC: 68 MG/DL (ref 40–60)
HEMOGLOBIN: 13.7 G/DL (ref 12–16)
INR BLD: 1.05 (ref 0.86–1.14)
IRON SATURATION: 29 % (ref 15–50)
IRON: 76 UG/DL (ref 37–145)
LDL CHOLESTEROL CALCULATED: 145 MG/DL
LYMPHOCYTES ABSOLUTE: 1.2 K/UL (ref 1–5.1)
LYMPHOCYTES RELATIVE PERCENT: 23.6 %
MCH RBC QN AUTO: 29.6 PG (ref 26–34)
MCHC RBC AUTO-ENTMCNC: 33.2 G/DL (ref 31–36)
MCV RBC AUTO: 89.3 FL (ref 80–100)
MONOCYTES ABSOLUTE: 0.5 K/UL (ref 0–1.3)
MONOCYTES RELATIVE PERCENT: 10.1 %
NEUTROPHILS ABSOLUTE: 3.3 K/UL (ref 1.7–7.7)
NEUTROPHILS RELATIVE PERCENT: 63 %
PDW BLD-RTO: 13.9 % (ref 12.4–15.4)
PLATELET # BLD: 248 K/UL (ref 135–450)
PMV BLD AUTO: 9.3 FL (ref 5–10.5)
POTASSIUM SERPL-SCNC: 4.2 MMOL/L (ref 3.5–5.1)
PROTHROMBIN TIME: 12.2 SEC (ref 10–13.2)
RBC # BLD: 4.64 M/UL (ref 4–5.2)
SODIUM BLD-SCNC: 138 MMOL/L (ref 136–145)
TOTAL IRON BINDING CAPACITY: 261 UG/DL (ref 260–445)
TOTAL PROTEIN: 7.5 G/DL (ref 6.4–8.2)
TRIGL SERPL-MCNC: 42 MG/DL (ref 0–150)
TSH REFLEX: 1.99 UIU/ML (ref 0.27–4.2)
VITAMIN B-12: 336 PG/ML (ref 211–911)
VITAMIN D 25-HYDROXY: 14.1 NG/ML
VLDLC SERPL CALC-MCNC: 8 MG/DL
WBC # BLD: 5.3 K/UL (ref 4–11)

## 2020-06-27 PROCEDURE — 82306 VITAMIN D 25 HYDROXY: CPT

## 2020-06-27 PROCEDURE — 84425 ASSAY OF VITAMIN B-1: CPT

## 2020-06-27 PROCEDURE — 85025 COMPLETE CBC W/AUTO DIFF WBC: CPT

## 2020-06-27 PROCEDURE — 93005 ELECTROCARDIOGRAM TRACING: CPT

## 2020-06-27 PROCEDURE — 82607 VITAMIN B-12: CPT

## 2020-06-27 PROCEDURE — 83036 HEMOGLOBIN GLYCOSYLATED A1C: CPT

## 2020-06-27 PROCEDURE — 84590 ASSAY OF VITAMIN A: CPT

## 2020-06-27 PROCEDURE — 83540 ASSAY OF IRON: CPT

## 2020-06-27 PROCEDURE — 80053 COMPREHEN METABOLIC PANEL: CPT

## 2020-06-27 PROCEDURE — 36415 COLL VENOUS BLD VENIPUNCTURE: CPT

## 2020-06-27 PROCEDURE — 84446 ASSAY OF VITAMIN E: CPT

## 2020-06-27 PROCEDURE — 82746 ASSAY OF FOLIC ACID SERUM: CPT

## 2020-06-27 PROCEDURE — 84443 ASSAY THYROID STIM HORMONE: CPT

## 2020-06-27 PROCEDURE — 80061 LIPID PANEL: CPT

## 2020-06-27 PROCEDURE — 83550 IRON BINDING TEST: CPT

## 2020-06-27 PROCEDURE — 85610 PROTHROMBIN TIME: CPT

## 2020-06-28 LAB
EKG ATRIAL RATE: 69 BPM
EKG DIAGNOSIS: NORMAL
EKG P AXIS: 49 DEGREES
EKG P-R INTERVAL: 170 MS
EKG Q-T INTERVAL: 414 MS
EKG QRS DURATION: 98 MS
EKG QTC CALCULATION (BAZETT): 443 MS
EKG R AXIS: 8 DEGREES
EKG T AXIS: 30 DEGREES
EKG VENTRICULAR RATE: 69 BPM
ESTIMATED AVERAGE GLUCOSE: 96.8 MG/DL
HBA1C MFR BLD: 5 %

## 2020-07-01 LAB
ALPHA-TOCOPHEROL: 9.7 MG/L (ref 5.5–18)
GAMMA-TOCOPHEROL: 1.9 MG/L (ref 0–6)
RETINYL PALMITATE: 0.03 MG/L (ref 0–0.1)
VITAMIN A LEVEL: 0.44 MG/L (ref 0.3–1.2)
VITAMIN A, INTERP: NORMAL
VITAMIN B1 WHOLE BLOOD: 96 NMOL/L (ref 70–180)

## 2020-07-10 RX ORDER — CHOLECALCIFEROL (VITAMIN D3) 1250 MCG
1 CAPSULE ORAL
Qty: 15 CAPSULE | Refills: 0 | Status: SHIPPED | OUTPATIENT
Start: 2020-07-10 | End: 2020-07-29 | Stop reason: ALTCHOICE

## 2020-07-28 PROBLEM — E55.9 VITAMIN D DEFICIENCY: Status: ACTIVE | Noted: 2020-07-28

## 2020-07-28 PROBLEM — E78.2 MIXED HYPERLIPIDEMIA: Status: ACTIVE | Noted: 2020-07-28

## 2020-07-28 NOTE — PATIENT INSTRUCTIONS
broiling, or grilling meats add flavor without unhealthy fats. Using cooking oil spray and spray butter products are also healthy options that will aid in your weight loss. Foods high in added sugars are often also high in calories and low in nutrients. Focusing on healthier eating habits will help you manage your weight long-term. Everyone's eating habits are often so ingrained that it can be difficult to change. It is important to maintain consistency when changing behaviors for them to become your new normal way of doing things. It is going to be challenging, but you wouldn't be here if you didn't want to change. Remember that overall health, age, and genetics make each person's weight loss progress different. Do not compare your progress to other patients and make sure you are following our recommendations for long-term success.     Plan/Goals:   Allow additional snack for ~1400 reno   Increase fluids to 64oz per day

## 2020-07-29 ENCOUNTER — TELEMEDICINE (OUTPATIENT)
Dept: BARIATRICS/WEIGHT MGMT | Age: 35
End: 2020-07-29
Payer: COMMERCIAL

## 2020-07-29 VITALS — BODY MASS INDEX: 45.82 KG/M2 | HEIGHT: 65 IN | WEIGHT: 275 LBS

## 2020-07-29 PROCEDURE — 99214 OFFICE O/P EST MOD 30 MIN: CPT | Performed by: NURSE PRACTITIONER

## 2020-07-29 RX ORDER — PHENTERMINE AND TOPIRAMATE 3.75; 23 MG/1; MG/1
1 CAPSULE, EXTENDED RELEASE ORAL DAILY
Qty: 14 CAPSULE | Refills: 0 | Status: SHIPPED | OUTPATIENT
Start: 2020-07-29 | End: 2020-08-12

## 2020-07-29 ASSESSMENT — ENCOUNTER SYMPTOMS
EYES NEGATIVE: 1
COUGH: 0
RESPIRATORY NEGATIVE: 1
GASTROINTESTINAL NEGATIVE: 1
SHORTNESS OF BREATH: 0
ALLERGIC/IMMUNOLOGIC NEGATIVE: 1

## 2020-07-29 NOTE — PROGRESS NOTES
Maddi Gardner wt is stable per pt report. Pt call was brief stated she had to get back to work. She states she will attempt to call tomorrow on her break - explained a dietitian may not be available at that time but we can return her call. Treatment plan details: 1200 reno LC, start on qsymia  Is patient adhering to treatment plan: generally per pt but still hungry    Is pt eating 4-5 times each day? Not asked    Is pt including lean protein with all meals and snacks? not asked    Is pt avoiding added sugars and starchy foods? yes generally    Consuming at least 64oz of calorie free fluids? not getting, 64oz - needs to work on this    Participating in intentional exercise? yes - walking daily for 10-30 min     Plan/Goals:   Allow additional snack for ~1400 reno   Increase fluids to 64oz per day    Handouts: none    Due to the COVID-19 restrictions on close contact interactions the patient's visit was conducted via telephone in saundra of a face to face visit. The patient is here through telemedicine for their 2nd [facility] visit.     Jazz Kennedy

## 2020-07-29 NOTE — PROGRESS NOTES
[facility]) Physicians   [facility] [employer]    2020    TELEHEALTH EVALUATION -- Audio/Visual (During KVZQX-51 public health emergency)    Medical Weight Loss    HPI: Sue Ruiz is a 29 y.o. female with Body mass index is 45.76 kg/m². Due to the COVID-19 restrictions on close contact interactions the patient's monthly visit was conducted via audio/video in saundra of a face to face visit. Patient has consented to have this visit conducted via audio/video and I am conducting it from the office. The patient is here through telemedicine for their medical weight loss visit. Patient denies any nausea, vomiting, fevers, chills, shortness of breath, chest pain, cough, constipation or difficulty urinating. Past Medical History:   Diagnosis Date    Anemia     taking iron    Back pain     Preeclampsia     UTI (lower urinary tract infection)     Vitamin D deficiency      Past Surgical History:   Procedure Laterality Date    ADENOIDECTOMY       SECTION      NRFHT    LEG SURGERY      bone taken from right hip to surgically repair fracture to RLE.    TONSILLECTOMY       Family History   Problem Relation Age of Onset    Asthma Mother     Diabetes Mother     Diabetes Maternal Aunt     High Blood Pressure Maternal Aunt     Diabetes Maternal Grandmother     Hypertension Father     Diabetes Father      Social History     Tobacco Use    Smoking status: Never Smoker    Smokeless tobacco: Never Used   Substance Use Topics    Alcohol use: No     Comment: occas     I counseled the patient on the importance of not smoking and risks of ETOH. No Known Allergies  Vitals:    20 1328   Weight: 275 lb (124.7 kg)   Height: 5' 5\" (1.651 m)     Body mass index is 45.76 kg/m².     Current Outpatient Medications:     vitamin C (ASCORBIC ACID) 500 MG tablet, Take 1 tablet by mouth daily, Disp: 30 tablet, Rfl: 3    topiramate (TOPAMAX) 25 MG tablet, One PO HS for 1 wk, 2 tabs PO HS for 1 wk, 1 PO AM and 2 PO HS for 1 wk 2 PO BID. (Patient not taking: Reported on 6/23/2020), Disp: 120 tablet, Rfl: 2    levonorgestrel (MIRENA, 52 MG,) IUD 52 mg, 1 each by Intrauterine route once, Disp: , Rfl:     ferrous sulfate (FE TABS) 325 (65 FE) MG EC tablet, Take 1 tablet by mouth 2 times daily. , Disp: 60 tablet, Rfl: 11    Lab Results   Component Value Date    WBC 5.3 06/27/2020    RBC 4.64 06/27/2020    HGB 13.7 06/27/2020    HCT 41.4 06/27/2020    MCV 89.3 06/27/2020    MCH 29.6 06/27/2020    MCHC 33.2 06/27/2020    MPV 9.3 06/27/2020    NEUTOPHILPCT 63.0 06/27/2020    LYMPHOPCT 23.6 06/27/2020    MONOPCT 10.1 06/27/2020    EOSRELPCT 2.5 06/27/2020    BASOPCT 0.8 06/27/2020    NEUTROABS 3.3 06/27/2020    LYMPHSABS 1.2 06/27/2020    MONOSABS 0.5 06/27/2020    EOSABS 0.1 06/27/2020     Lab Results   Component Value Date     06/27/2020    K 4.2 06/27/2020     06/27/2020    CO2 22 06/27/2020    ANIONGAP 13 06/27/2020    GLUCOSE 70 06/27/2020    BUN 12 06/27/2020    CREATININE 0.7 06/27/2020    LABGLOM >60 06/27/2020    GFRAA >60 06/27/2020    GFRAA >60 02/01/2012    CALCIUM 9.5 06/27/2020    PROT 7.5 06/27/2020    LABALBU 4.2 06/27/2020    AGRATIO 1.3 06/27/2020    BILITOT 0.5 06/27/2020    ALKPHOS 78 06/27/2020    ALT 14 06/27/2020    AST 15 06/27/2020    GLOB 3.3 06/27/2020     Lab Results   Component Value Date    CHOL 221 06/27/2020    TRIG 42 06/27/2020    HDL 68 06/27/2020    LDLCALC 145 06/27/2020    LABVLDL 8 06/27/2020     Lab Results   Component Value Date    TSHREFLEX 1.99 06/27/2020     Lab Results   Component Value Date    IRON 76 06/27/2020    TIBC 261 06/27/2020    LABIRON 29 06/27/2020     Lab Results   Component Value Date    CEHLCAFJ22 336 06/27/2020    FOLATE 10.19 06/27/2020     Lab Results   Component Value Date    VITD25 14.1 06/27/2020     Lab Results   Component Value Date    LABA1C 5.0 06/27/2020    EAG 96.8 06/27/2020       Patient Active Problem List   Diagnosis    Previous  section-Primary low transverse via pfannenstiel incision    Obesity    Localized swelling, mass, or lump of lower extremity    Anemia complicating pregnancy in second trimester    Polyhydramnios    GBS (group B Streptococcus carrier), +RV culture, currently pregnant     delivery delivered    Morbid obesity with BMI of 45.0-49.9, adult (HCC)    Chronic GERD    Chronic midline low back pain without sciatica    Arthritis of knee, right    Vitamin D deficiency    Mixed hyperlipidemia     Review of Systems   Constitutional: Negative. Negative for chills, fatigue and fever. HENT: Negative. Eyes: Negative. Respiratory: Negative. Negative for cough and shortness of breath. Cardiovascular: Negative. Gastrointestinal: Negative. Endocrine: Negative. Genitourinary: Negative. Musculoskeletal: Negative. Skin: Negative. Allergic/Immunologic: Negative. Neurological: Negative. Hematological: Negative. Psychiatric/Behavioral: Negative. PHYSICAL EXAMINATION:    Constitutional: [x] Appears well-developed and well-nourished [x] No apparent distress      [] Abnormal-   Mental status  [x] Alert and awake  [x] Oriented to person/place/time [x]Able to follow commands      Eyes:  EOM    [x]  Normal  [] Abnormal-  Sclera  [x]  Normal  [] Abnormal -         Discharge [x]  None visible  [] Abnormal -    HENT:   [x] Normocephalic, atraumatic.   [] Abnormal     Neck: [x] No visualized mass     Pulmonary/Chest: [x] Respiratory effort normal.  [x] No visualized signs of difficulty breathing or respiratory distress        [] Abnormal-      Musculoskeletal:   [] Normal gait with no signs of ataxia         [x] Normal range of motion of neck        [] Abnormal-     Neurological:        [x] No Facial Asymmetry (Cranial nerve 7 motor function) (limited exam to video visit)          [x] No gaze palsy        [] Abnormal-         Skin:        [x] No significant exanthematous lesions or discoloration noted on facial skin         [] Abnormal-            Psychiatric:       [x] Normal Affect [x] No Hallucinations        [] Abnormal-     Other pertinent observable physical exam findings-     Due to this being a TeleHealth encounter, evaluation of the following organ systems is limited: Vitals/Constitutional/EENT/Resp/CV/GI//MS/Neuro/Skin/Heme-Lymph-Imm. Assessment and Plan:    Patient is here via telemedicine for their 2nd medical weight loss visit, no change in weight today. The patient's current Body mass index is 45.76 kg/m². (7/29/20). She is doing ok, making dietary and behavior modifications. She is somewhat following dietary recommendations utilizing the 1200 calorie LCMP. She did speak with the registered dietitian for continued follow up. I agree with recommendations and plan. She is exercising with walking daily. Encouraged continued physical activity. Discussed risks, benefits and alternatives of Qsymia. Patient meets BMI criteria, confirms negative pregnancy status monthly, denies any significant coronary artery disease, glaucoma,hyperthyroidism, MAOIs within the past 14 days and no known hypersensitivity to the sympathomimetic amines, kidney or liver impairment. EKG completed and reviewed today. OARRS report completed and reviewed today. Will start the patient on the starter dose of Qsymia 3.75mg/23mg and see her back in 2 weeks. Explained to patient that we will monitor their weight loss every 12 weeks and if they have not lost at least 5% of their body weight we will either look to increase the medication or discontinue. If you achieve the 5% weight loss and are taking the 7.5mg/46mg dose we will continue at the same dose and evaluate again in 12 weeks.  If you do not achieve the 5% weight loss we can choose to discontinue the medication or increase your dose to the 11.25 mg/69mg for 2 weeks and then increase to the maximum dose of 15 mg/92mg and monitor your weight loss over the next 12 weeks. If you do not achieve the 5% weight loss over the next 12 weeks we will have to discontinue the medication. Counseled patient on proper use and potential side effects. Explained to patient that the maximum dose of this medication will need to be tapered when she is ready to discontinue it. she understands that abrupt cessation of this dose may cause adverse reactions including seizures. she understands that it is her responsibility to make sure that she does not run out of medications and to follow up to her appointments every 2-4 weeks as recommended. Heavily counseled on the importance of therapeutic lifestyle changes through diet and exercise. The patient's current weight is 275 lbs. The goal for the patient in the next 12 weeks is a loss of 8.25-13.5 lbs by their October 2020 visit (Starting weight for this goal is 275 lbs). Discussed possible side effects including, but not limited to palpitations, irritability, paresthesias, dizziness, dysgeusia, insomnia, constipation and dry mouth. Patient is responsible for keeping their monthly appointments. Failure to comply with their monthly visits will result in discontinuing the Qsymia. The patient also understands that if they are off of the medication for 7 days, the medication will be discontinued and cannot be restarted for six months. The patient understands they are not to drink alcohol while on this medication. Females, it is your responsibility to obtain negative pregnancy tests each month. Discussed with patient to make sure they fill their prescription within at least 7 days of this appointment. We will see her back in 2 weeks for continued follow up or via telemedicine depending on COVID-19 restrictions at the time of their next appointment. A total of 25 minutes was spent conversing with the patient and over half that time was spent counseling the patient on medication, proper dietary behaviors and exercise.     An electronic signature was used to authenticate this note. Pursuant to the emergency declaration under the [facility], [facility] waiver authority and the [employer] Act, this Virtual  Visit was conducted, with patient's consent, to reduce the patient's risk of exposure to COVID-19 and provide continuity of care for an established patient. Services were provided through a video synchronous discussion virtually to substitute for in-person clinic visit.

## 2020-08-06 ASSESSMENT — ENCOUNTER SYMPTOMS
RESPIRATORY NEGATIVE: 1
COUGH: 0
CONSTIPATION: 0
GASTROINTESTINAL NEGATIVE: 1
ALLERGIC/IMMUNOLOGIC NEGATIVE: 1
EYES NEGATIVE: 1
SHORTNESS OF BREATH: 0

## 2020-08-06 NOTE — PROGRESS NOTES
[facility]) Physicians   [facility]    2020    TELEHEALTH EVALUATION -- Audio/Visual (During PTOQI-85 public health emergency)    Medical Weight Loss    HPI: Tess Yap is a 29 y.o. female with Body mass index is 44.76 kg/m². Due to the COVID-19 restrictions on close contact interactions the patient's monthly visit was conducted via audio/video in saundra of a face to face visit. Patient has consented to have this visit conducted via audio/video and I am conducting it from the office. The patient is here through telemedicine for their medical weight loss visit. Patient denies any nausea, vomiting, fevers, chills, shortness of breath, chest pain, cough, constipation or difficulty urinating. Past Medical History:   Diagnosis Date    Anemia     taking iron    Back pain     Preeclampsia     UTI (lower urinary tract infection)     Vitamin D deficiency      Past Surgical History:   Procedure Laterality Date    ADENOIDECTOMY       SECTION      NRFHT    LEG SURGERY      bone taken from right hip to surgically repair fracture to RLE.    TONSILLECTOMY       Family History   Problem Relation Age of Onset    Asthma Mother     Diabetes Mother     Diabetes Maternal Aunt     High Blood Pressure Maternal Aunt     Diabetes Maternal Grandmother     Hypertension Father     Diabetes Father      Social History     Tobacco Use    Smoking status: Never Smoker    Smokeless tobacco: Never Used   Substance Use Topics    Alcohol use: No     Comment: occas     I counseled the patient on the importance of not smoking and risks of ETOH. No Known Allergies  Vitals:    20 1343   Weight: 269 lb (122 kg)   Height: 5' 5\" (1.651 m)     Body mass index is 44.76 kg/m². Current Outpatient Medications:     Phentermine-Topiramate (QSYMIA) 3.75-23 MG CP24, Take 1 capsule by mouth daily for 14 days. , Disp: 14 capsule, Rfl: 0    vitamin C (ASCORBIC ACID) 500 MG tablet, Take 1 tablet by mouth daily, Disp: 30 tablet, Rfl: 3    topiramate (TOPAMAX) 25 MG tablet, One PO HS for 1 wk, 2 tabs PO HS for 1 wk, 1 PO AM and 2 PO HS for 1 wk 2 PO BID. (Patient not taking: Reported on 6/23/2020), Disp: 120 tablet, Rfl: 2    levonorgestrel (MIRENA, 52 MG,) IUD 52 mg, 1 each by Intrauterine route once, Disp: , Rfl:     ferrous sulfate (FE TABS) 325 (65 FE) MG EC tablet, Take 1 tablet by mouth 2 times daily. , Disp: 60 tablet, Rfl: 11    Lab Results   Component Value Date    WBC 5.3 06/27/2020    RBC 4.64 06/27/2020    HGB 13.7 06/27/2020    HCT 41.4 06/27/2020    MCV 89.3 06/27/2020    MCH 29.6 06/27/2020    MCHC 33.2 06/27/2020    MPV 9.3 06/27/2020    NEUTOPHILPCT 63.0 06/27/2020    LYMPHOPCT 23.6 06/27/2020    MONOPCT 10.1 06/27/2020    EOSRELPCT 2.5 06/27/2020    BASOPCT 0.8 06/27/2020    NEUTROABS 3.3 06/27/2020    LYMPHSABS 1.2 06/27/2020    MONOSABS 0.5 06/27/2020    EOSABS 0.1 06/27/2020     Lab Results   Component Value Date     06/27/2020    K 4.2 06/27/2020     06/27/2020    CO2 22 06/27/2020    ANIONGAP 13 06/27/2020    GLUCOSE 70 06/27/2020    BUN 12 06/27/2020    CREATININE 0.7 06/27/2020    LABGLOM >60 06/27/2020    GFRAA >60 06/27/2020    GFRAA >60 02/01/2012    CALCIUM 9.5 06/27/2020    PROT 7.5 06/27/2020    LABALBU 4.2 06/27/2020    AGRATIO 1.3 06/27/2020    BILITOT 0.5 06/27/2020    ALKPHOS 78 06/27/2020    ALT 14 06/27/2020    AST 15 06/27/2020    GLOB 3.3 06/27/2020     Lab Results   Component Value Date    CHOL 221 06/27/2020    TRIG 42 06/27/2020    HDL 68 06/27/2020    LDLCALC 145 06/27/2020    LABVLDL 8 06/27/2020     Lab Results   Component Value Date    TSHREFLEX 1.99 06/27/2020     Lab Results   Component Value Date    IRON 76 06/27/2020    TIBC 261 06/27/2020    LABIRON 29 06/27/2020     Lab Results   Component Value Date    HTVKLRPI15 336 06/27/2020    FOLATE 10.19 06/27/2020     Lab Results   Component Value Date    VITD25 14.1 06/27/2020     Lab Results   Component Value Date    LABA1C 5.0 2020    EAG 96.8 2020       Patient Active Problem List   Diagnosis    Previous  section-Primary low transverse via pfannenstiel incision    Obesity    Localized swelling, mass, or lump of lower extremity    Anemia complicating pregnancy in second trimester    Polyhydramnios    GBS (group B Streptococcus carrier), +RV culture, currently pregnant     delivery delivered    Morbid obesity with BMI of 45.0-49.9, adult ([address]    Chronic GERD    Chronic midline low back pain without sciatica    Arthritis of knee, right    Vitamin D deficiency    Mixed hyperlipidemia     Review of Systems   Constitutional: Negative. Negative for chills, fatigue and fever. HENT: Negative. Eyes: Negative. Respiratory: Negative. Negative for cough and shortness of breath. Cardiovascular: Negative. Gastrointestinal: Negative. Negative for constipation. Endocrine: Negative. Genitourinary: Negative. Musculoskeletal: Negative. Skin: Negative. Allergic/Immunologic: Negative. Neurological: Negative. Negative for dizziness, numbness and headaches. Hematological: Negative. Psychiatric/Behavioral: Negative. Negative for sleep disturbance. PHYSICAL EXAMINATION:    Constitutional: [x] Appears well-developed and well-nourished [x] No apparent distress      [] Abnormal-   Mental status  [x] Alert and awake  [x] Oriented to person/place/time [x]Able to follow commands      Eyes:  EOM    [x]  Normal  [] Abnormal-  Sclera  [x]  Normal  [] Abnormal -         Discharge [x]  None visible  [] Abnormal -    HENT:   [x] Normocephalic, atraumatic.   [] Abnormal     Neck: [x] No visualized mass     Pulmonary/Chest: [x] Respiratory effort normal.  [x] No visualized signs of difficulty breathing or respiratory distress        [] Abnormal-      Musculoskeletal:   [] Normal gait with no signs of ataxia         [x] Normal range of motion of neck        [] weight loss and are taking the 7.5mg/46mg dose we will continue at the same dose and evaluate again in 12 weeks. If you do not achieve the 5% weight loss we can choose to discontinue the medication or increase your dose to the 11.25 mg/69mg for 2 weeks and then increase to the maximum dose of 15 mg/92mg and monitor your weight loss over the next 12 weeks. If you do not achieve the 5% weight loss over the next 12 weeks we will have to discontinue the medication. Counseled patient on proper use and potential side effects. Explained to patient that the maximum dose of this medication will need to be tapered when she is ready to discontinue it. she understands that abrupt cessation of this dose may cause adverse reactions including seizures. she understands that it is her responsibility to make sure that she does not run out of medications and to follow up to her appointments every 2-4 weeks as recommended. Heavily counseled on the importance of therapeutic lifestyle changes through diet and exercise. The patient's current weight is 269 lbs. The goal for the patient in the 12 weeks is a loss of 8.25-13.5 lbs by their October 2020 visit (Starting weight for this goal is 275 lbs). Currently the patient has lost 6 lbs of their 12 week goal.     Discussed possible side effects including, but not limited to palpitations, irritability, paresthesias, dizziness, dysgeusia, insomnia, constipation and dry mouth. Patient is responsible for keeping their monthly appointments. Failure to comply with their monthly visits will result in discontinuing the Qsymia. The patient also understands that if they are off of the medication for 7 days, the medication will be discontinued and cannot be restarted for six months. The patient understands they are not to drink alcohol while on this medication. Females, it is your responsibility to obtain negative pregnancy tests each month.  Discussed with patient to make sure they fill their prescription within at least 7 days of this appointment. We will see her back in 1 month for continued follow up or via telemedicine depending on COVID-19 restrictions at the time of their next appointment. A total of 15 minutes was spent conversing with the patient and over half that time was spent counseling the patient on the medication, proper dietary behaviors and exercise. An electronic signature was used to authenticate this note. Pursuant to the emergency declaration under the [address] [place] [address], [facility], [facility] waiver authority and the [employer] and [employer] Act, this Virtual  Visit was conducted, with patient's consent, to reduce the patient's risk of exposure to COVID-19 and provide continuity of care for an established patient. Services were provided through a video synchronous discussion virtually to substitute for in-person clinic visit.

## 2020-08-06 NOTE — PATIENT INSTRUCTIONS
Key dietary points:     You should be eating protein at every meal and snack.  Protein is typically found in animal sources, i.e. chicken, lean beef, lean pork, fish, seafood and eggs. It is also found in low-fat dairy sources such as skim or 1% milk, low-fat yogurt, low-fat cheese, and low-fat cottage cheese. Plant based sources of protein include peanut butter, beans, nuts, seeds, hummus and soy.  Meats (preferably organic or grass fed) are great sources of protein and have no carbohydrates.  It is suggested to use coconut, olive, avocado, or almond oils. Choose vegetables that grow above ground as they are generally lower in carbohydrates and higher in fiber.  Avoid starches such as bread, rice, potatoes, pasta and all sources of simple sugars (desserts, soda, breakfast cereals). Choose beverages that are low in calories and sugar.  You should be drinking 64 ounces of low calorie (5 calories or less per serving) fluids per day. Suggestions include:  o Water (you may add fresh fruit, lemon, lime, cucumber or mint)  o Crystal Light  o Whitehouse Liquid Water Enhancer  o Propel Zero  o Powerade Zero/Gatorade Zero  o Isopure  o [id number]  o SOBE Lifewater Zero  o Vitamin Water Zero  o Sugar Free Issa-Aid    You should be eating 4-5 times per day.  Three small meals plus 1-2 snacks per day is your goal. This balances your calories and nutrients evenly throughout the day and helps to boost your metabolism. Refer to the snack list provided at your initial visit. You should be utilizing the 9-inch plate method.  Eating on a smaller plate will help you control portion sizes, but what you put on your plate counts. Make ¼ of your plate lean protein, ¼ carbohydrate (fruit or dairy) and ½ the plate non-starchy vegetables. You should be reducing added fat and sugar in your diet.  Frying foods adds too much fat and calories, but you could use an air fryer as it requires significantly less oil.  Baking, broiling, or grilling meats add flavor without unhealthy fats. Using cooking oil spray and spray butter products are also healthy options that will aid in your weight loss. Foods high in added sugars are often also high in calories and low in nutrients. Focusing on healthier eating habits will help you manage your weight long-term. Everyone's eating habits are often so ingrained that it can be difficult to change. It is important to maintain consistency when changing behaviors for them to become your new normal way of doing things. It is going to be challenging, but you wouldn't be here if you didn't want to change. Remember that overall health, age, and genetics make each person's weight loss progress different. Do not compare your progress to other patients and make sure you are following our recommendations for long-term success.

## 2020-08-12 ENCOUNTER — TELEMEDICINE (OUTPATIENT)
Dept: BARIATRICS/WEIGHT MGMT | Age: 35
End: 2020-08-12
Payer: COMMERCIAL

## 2020-08-12 VITALS — BODY MASS INDEX: 44.82 KG/M2 | HEIGHT: 65 IN | WEIGHT: 269 LBS

## 2020-08-12 PROCEDURE — 99213 OFFICE O/P EST LOW 20 MIN: CPT | Performed by: NURSE PRACTITIONER

## 2020-08-12 RX ORDER — PHENTERMINE AND TOPIRAMATE 7.5; 46 MG/1; MG/1
1 CAPSULE, EXTENDED RELEASE ORAL DAILY
Qty: 30 CAPSULE | Refills: 0 | Status: SHIPPED | OUTPATIENT
Start: 2020-08-12 | End: 2020-09-11

## 2020-08-12 NOTE — PROGRESS NOTES
Sergio Stallings lost 6 lbs over 2 weeks. Treatment plan details: 1200kcal/LC + Qsymia  B: salad with grilled chicken  S: danon light and fit greek yogurt  L: grilled nuggets with side salad from [employer]  S: broccoli and ranch  D: protein (steak) + broccoli OR fish with green beans    Is patient adhering to treatment plan: yes , for the most part    Is pt eating 4-5 times each day? yes , she is    Is pt including lean protein with all meals and snacks? yes , trying to do this but gets confused with prepackaged items    Is pt avoiding added sugars and starchy foods? yes , she is    Consuming at least 64oz of calorie free fluids? 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 yes , walking but wants more variety    Plan/Goals:  Keep exploring other options for snacks/breakfasts/lunches - reviewed parameters for snacks  [facility] at [facility]    Handouts: copy of nutrition recommendations for medical patients, low carb frozen meals, protein bars    Due to the COVID-19 restrictions on close contact interactions the patient's visit was conducted via telephone in saundra of a face to face visit. The patient is here through telemedicine for their medical weight loss visit.     Matthew Moore

## 2020-09-08 PROBLEM — E66.01 MORBID OBESITY WITH BMI OF 40.0-44.9, ADULT (HCC): Status: ACTIVE | Noted: 2020-09-08

## 2020-09-08 ASSESSMENT — ENCOUNTER SYMPTOMS
SHORTNESS OF BREATH: 0
GASTROINTESTINAL NEGATIVE: 1
COUGH: 0
ALLERGIC/IMMUNOLOGIC NEGATIVE: 1
CONSTIPATION: 0
EYES NEGATIVE: 1
RESPIRATORY NEGATIVE: 1

## 2020-09-08 NOTE — PROGRESS NOTES
[facility]) Physicians   [facility]    Medical Weight Loss    HPI: Adell Severance is a 29 y.o. female with Body mass index is 43.8 kg/m². Here for medical weight loss. Patient denies any nausea, vomiting, fevers, chills, shortness of breath, chest pain, cough, constipation or difficulty urinating. Past Medical History:   Diagnosis Date    Anemia     taking iron    Back pain     Preeclampsia     UTI (lower urinary tract infection)     Vitamin D deficiency      Past Surgical History:   Procedure Laterality Date    ADENOIDECTOMY       SECTION      NRFHT    LEG SURGERY      bone taken from right hip to surgically repair fracture to RLE.    TONSILLECTOMY       Family History   Problem Relation Age of Onset    Asthma Mother     Diabetes Mother     Diabetes Maternal Aunt     High Blood Pressure Maternal Aunt     Diabetes Maternal Grandmother     Hypertension Father     Diabetes Father      Social History     Tobacco Use    Smoking status: Never Smoker    Smokeless tobacco: Never Used   Substance Use Topics    Alcohol use: No     Comment: occas     I counseled the patient on the importance of not smoking and risks of ETOH. No Known Allergies  Vitals:    20 0817 20 0841   BP: (!) 151/88 (!) 143/92   Site:  Left Wrist   Position:  Sitting   Pulse: 83 65   SpO2: 98%    Weight: 263 lb 3.2 oz (119.4 kg)    Height: 5' 5\" (1.651 m)      Body mass index is 43.8 kg/m². Current Outpatient Medications:     vitamin C (ASCORBIC ACID) 500 MG tablet, Take 1 tablet by mouth daily, Disp: 30 tablet, Rfl: 3    topiramate (TOPAMAX) 25 MG tablet, One PO HS for 1 wk, 2 tabs PO HS for 1 wk, 1 PO AM and 2 PO HS for 1 wk 2 PO BID., Disp: 120 tablet, Rfl: 2    levonorgestrel (MIRENA, 52 MG,) IUD 52 mg, 1 each by Intrauterine route once, Disp: , Rfl:     ferrous sulfate (FE TABS) 325 (65 FE) MG EC tablet, Take 1 tablet by mouth 2 times daily. , Disp: 60 tablet, Rfl: 11    Lab Results   Component Value Date    WBC 5.3 2020    RBC 4.64 2020    HGB 13.7 2020    HCT 41.4 2020    MCV 89.3 2020    MCH 29.6 2020    MCHC 33.2 2020    MPV 9.3 2020    NEUTOPHILPCT 63.0 2020    LYMPHOPCT 23.6 2020    MONOPCT 10.1 2020    EOSRELPCT 2.5 2020    BASOPCT 0.8 2020    NEUTROABS 3.3 2020    LYMPHSABS 1.2 2020    MONOSABS 0.5 2020    EOSABS 0.1 2020     Lab Results   Component Value Date     2020    K 4.2 2020     2020    CO2 22 2020    ANIONGAP 13 2020    GLUCOSE 70 2020    BUN 12 2020    CREATININE 0.7 2020    LABGLOM >60 2020    GFRAA >60 2020    GFRAA >60 2012    CALCIUM 9.5 2020    PROT 7.5 2020    LABALBU 4.2 2020    AGRATIO 1.3 2020    BILITOT 0.5 2020    ALKPHOS 78 2020    ALT 14 2020    AST 15 2020    GLOB 3.3 2020     Lab Results   Component Value Date    CHOL 221 2020    TRIG 42 2020    HDL 68 2020    LDLCALC 145 2020    LABVLDL 8 2020     Lab Results   Component Value Date    TSHREFLEX 1.99 2020     Lab Results   Component Value Date    IRON 76 2020    TIBC 261 2020    LABIRON 29 2020     Lab Results   Component Value Date    APAHFMZK87 336 2020    FOLATE 10.19 2020     Lab Results   Component Value Date    VITD25 14.1 2020     Lab Results   Component Value Date    LABA1C 5.0 2020    EAG 96.8 2020       Patient Active Problem List   Diagnosis    Previous  section-Primary low transverse via pfannenstiel incision    Obesity    Localized swelling, mass, or lump of lower extremity    Anemia complicating pregnancy in second trimester    Polyhydramnios    GBS (group B Streptococcus carrier), +RV culture, currently pregnant     delivery delivered    Morbid obesity dietary recommendations utilizing the 1200 calorie LCMP. Needs to continue working on adding protein to her snacks. She did meet with the registered dietitian for continued follow up. I agree with recommendations and plan. She is exercising five days per week with walking at work. Encouraged continued physical activity and will also get patient signed up for the [facility]. Discussed risks, benefits and alternatives of Qsymia. Patient meets BMI criteria, confirms negative pregnancy status monthly, denies any significant coronary artery disease, glaucoma,hyperthyroidism, MAOIs within the past 14 days and no known hypersensitivity to the sympathomimetic amines, kidney or liver impairment. EKG previously completed and reviewed. OARRS report completed and reviewed today. Explained to patient that we will monitor their weight loss every 12 weeks and if they have not lost at least 5% of their body weight we will either look to increase the medication or discontinue. If you achieve the 5% weight loss and are taking the 7.5mg/46mg dose we will continue at the same dose and evaluate again in 12 weeks. If you do not achieve the 5% weight loss we can choose to discontinue the medication or increase your dose to the 11.25 mg/69mg for 2 weeks and then increase to the maximum dose of 15 mg/92mg and monitor your weight loss over the next 12 weeks. If you do not achieve the 5% weight loss over the next 12 weeks we will have to discontinue the medication. Counseled patient on proper use and potential side effects. Explained to patient that the maximum dose of this medication will need to be tapered when she is ready to discontinue it. she understands that abrupt cessation of this dose may cause adverse reactions including seizures. she understands that it is her responsibility to make sure that she does not run out of medications and to follow up to her appointments every 2-4 weeks as recommended.  Heavily counseled on the importance of therapeutic lifestyle changes through diet and exercise. The patient's current weight is 263.2 lbs. The goal for the patient in the 12 weeks is a loss of 8.25-13.5 lbs by their October 2020 visit (Starting weight for this goal is 275 lbs). Currently the patient has lost 11.8 lbs of their 12 week goal.     Discussed possible side effects including, but not limited to palpitations, irritability, paresthesias, dizziness, dysgeusia, insomnia, constipation and dry mouth. Patient is responsible for keeping their monthly appointments. Failure to comply with their monthly visits will result in discontinuing the Qsymia. The patient also understands that if they are off of the medication for 7 days, the medication will be discontinued and cannot be restarted for six months. The patient understands they are not to drink alcohol while on this medication. Females, it is your responsibility to obtain negative pregnancy tests each month. Discussed with patient to make sure they fill their prescription within at least 7 days of this appointment. We will see her back in 1 month for continued follow up. A total of 25 minutes was spent face-to-face with the patient and over half that time was spent counseling the patient on the medication, proper dietary behaviors and exercise.

## 2020-09-08 NOTE — PATIENT INSTRUCTIONS
Key dietary points:     You should be eating protein at every meal and snack.  Protein is typically found in animal sources, i.e. chicken, lean beef, lean pork, fish, seafood and eggs. It is also found in low-fat dairy sources such as skim or 1% milk, low-fat yogurt, low-fat cheese, and low-fat cottage cheese. Plant based sources of protein include peanut butter, beans, nuts, seeds, hummus and soy.  Meats (preferably organic or grass fed) are great sources of protein and have no carbohydrates.  It is suggested to use coconut, olive, avocado, or almond oils. Choose vegetables that grow above ground as they are generally lower in carbohydrates and higher in fiber.  Avoid starches such as bread, rice, potatoes, pasta and all sources of simple sugars (desserts, soda, breakfast cereals). Choose beverages that are low in calories and sugar.  You should be drinking 64 ounces of low calorie (5 calories or less per serving) fluids per day. Suggestions include:  o Water (you may add fresh fruit, lemon, lime, cucumber or mint)  o Crystal Light  o [place] Liquid Water Enhancer  o Propel Zero  o Powerade Zero/Gatorade Zero  o Isopure  o [id number]  o SOBE Lifewater Zero  o Vitamin Water Zero  o Sugar Free Issa-Aid    You should be eating 4-5 times per day.  Three small meals plus 1-2 snacks per day is your goal. This balances your calories and nutrients evenly throughout the day and helps to boost your metabolism. Refer to the snack list provided at your initial visit. You should be utilizing the 9-inch plate method.  Eating on a smaller plate will help you control portion sizes, but what you put on your plate counts. Make ¼ of your plate lean protein, ¼ carbohydrate (fruit or dairy) and ½ the plate non-starchy vegetables. You should be reducing added fat and sugar in your diet.  Frying foods adds too much fat and calories, but you could use an air fryer as it requires significantly less oil.  Baking, broiling, or grilling meats add flavor without unhealthy fats. Using cooking oil spray and spray butter products are also healthy options that will aid in your weight loss. Foods high in added sugars are often also high in calories and low in nutrients. Focusing on healthier eating habits will help you manage your weight long-term. Everyone's eating habits are often so ingrained that it can be difficult to change. It is important to maintain consistency when changing behaviors for them to become your new normal way of doing things. It is going to be challenging, but you wouldn't be here if you didn't want to change. Remember that overall health, age, and genetics make each person's weight loss progress different. Do not compare your progress to other patients and make sure you are following our recommendations for long-term success. Explained to patient that we will monitor their weight loss every 12 weeks and if they have not lost at least 5% of their body weight we will either look to increase the medication or discontinue. If you achieve the 5% weight loss and are taking the 7.5mg/46mg dose we will continue at the same dose and evaluate again in 12 weeks. If you do not achieve the 5% weight loss we can choose to discontinue the medication or increase your dose to the 11.25 mg/69mg for 2 weeks and then increase to the maximum dose of 15 mg/92mg and monitor your weight loss over the next 12 weeks. If you do not achieve the 5% weight loss over the next 12 weeks we will have to discontinue the medication. Counseled patient on proper use and potential side effects. Explained to patient that the maximum dose of this medication will need to be tapered when she is ready to discontinue it. she understands that abrupt cessation of this dose may cause adverse reactions including seizures.  she understands that it is her responsibility to make sure that she does not run out of medications and to follow up to her appointments every 2-4 weeks as recommended. Heavily counseled on the importance of therapeutic lifestyle changes through diet and exercise. The patient's current weight is 263.2 lbs. The goal for the patient in the 12 weeks is a loss of 8.25-13.5 lbs by their October 2020 visit (Starting weight for this goal is 275 lbs). Currently the patient has lost 11.8 lbs of their 12 week goal.     Discussed possible side effects including, but not limited to palpitations, irritability, paresthesias, dizziness, dysgeusia, insomnia, constipation and dry mouth. Patient is responsible for keeping their monthly appointments. Failure to comply with their monthly visits will result in discontinuing the Qsymia. The patient also understands that if they are off of the medication for 7 days, the medication will be discontinued and cannot be restarted for six months. The patient understands they are not to drink alcohol while on this medication. Females, it is your responsibility to obtain negative pregnancy tests each month. Discussed with patient to make sure they fill their prescription within at least 7 days of this appointment. **You may receive a survey regarding the care you received during your visit. Your input is valuable to us. We encourage you to complete and return your survey. We hope you will choose us in the future for your healthcare needs. Patient received dietary handouts and education.

## 2020-09-14 ENCOUNTER — OFFICE VISIT (OUTPATIENT)
Dept: BARIATRICS/WEIGHT MGMT | Age: 35
End: 2020-09-14
Payer: COMMERCIAL

## 2020-09-14 VITALS
HEIGHT: 65 IN | WEIGHT: 263.2 LBS | SYSTOLIC BLOOD PRESSURE: 143 MMHG | BODY MASS INDEX: 43.85 KG/M2 | DIASTOLIC BLOOD PRESSURE: 92 MMHG | HEART RATE: 65 BPM | OXYGEN SATURATION: 98 %

## 2020-09-14 PROCEDURE — 99214 OFFICE O/P EST MOD 30 MIN: CPT | Performed by: NURSE PRACTITIONER

## 2020-09-14 RX ORDER — PHENTERMINE AND TOPIRAMATE 7.5; 46 MG/1; MG/1
1 CAPSULE, EXTENDED RELEASE ORAL DAILY
Qty: 30 CAPSULE | Refills: 0 | Status: SHIPPED | OUTPATIENT
Start: 2020-09-14 | End: 2020-10-12

## 2020-10-10 PROBLEM — Z79.899 HIGH RISK MEDICATIONS (NOT ANTICOAGULANTS) LONG-TERM USE: Status: ACTIVE | Noted: 2020-10-10

## 2020-10-10 ASSESSMENT — ENCOUNTER SYMPTOMS
COUGH: 0
GASTROINTESTINAL NEGATIVE: 1
ALLERGIC/IMMUNOLOGIC NEGATIVE: 1
SHORTNESS OF BREATH: 0
RESPIRATORY NEGATIVE: 1
EYES NEGATIVE: 1
CONSTIPATION: 0

## 2020-10-10 NOTE — PROGRESS NOTES
[facility]) Physicians   [facility]    Medical Weight Loss    HPI: Ciarra Dickson is a 29 y.o. female with Body mass index is 43.17 kg/m². Here for medical weight loss. Patient denies any nausea, vomiting, fevers, chills, shortness of breath, chest pain, cough, constipation or difficulty urinating. Past Medical History:   Diagnosis Date    Anemia     taking iron    Back pain     Preeclampsia     UTI (lower urinary tract infection)     Vitamin D deficiency      Past Surgical History:   Procedure Laterality Date    ADENOIDECTOMY       SECTION      NRFHT    LEG SURGERY      bone taken from right hip to surgically repair fracture to RLE.    TONSILLECTOMY       Family History   Problem Relation Age of Onset    Asthma Mother     Diabetes Mother     Diabetes Maternal Aunt     High Blood Pressure Maternal Aunt     Diabetes Maternal Grandmother     Hypertension Father     Diabetes Father      Social History     Tobacco Use    Smoking status: Never Smoker    Smokeless tobacco: Never Used   Substance Use Topics    Alcohol use: No     Comment: occas     I counseled the patient on the importance of not smoking and risks of ETOH. No Known Allergies  Vitals:    10/12/20 0806 10/12/20 0825   BP: (!) 143/82 (!) 138/94   Site:  Right Wrist   Position:  Sitting   Pulse: 77 68   Resp: 18    SpO2: 98%    Weight: 259 lb 6.4 oz (117.7 kg)    Height: 5' 5\" (1.651 m)      Body mass index is 43.17 kg/m². Current Outpatient Medications:     Phentermine-Topiramate (QSYMIA) 7.5-46 MG CP24, Take 1 capsule by mouth daily for 30 days. , Disp: 30 capsule, Rfl: 0    vitamin C (ASCORBIC ACID) 500 MG tablet, Take 1 tablet by mouth daily, Disp: 30 tablet, Rfl: 3    topiramate (TOPAMAX) 25 MG tablet, One PO HS for 1 wk, 2 tabs PO HS for 1 wk, 1 PO AM and 2 PO HS for 1 wk 2 PO BID., Disp: 120 tablet, Rfl: 2    levonorgestrel (MIRENA, 52 MG,) IUD 52 mg, 1 each by Intrauterine route once, Disp: , Rfl:     ferrous sulfate (FE TABS) 325 (65 FE) MG EC tablet, Take 1 tablet by mouth 2 times daily. , Disp: 60 tablet, Rfl: 11    Lab Results   Component Value Date    WBC 5.3 2020    RBC 4.64 2020    HGB 13.7 2020    HCT 41.4 2020    MCV 89.3 2020    MCH 29.6 2020    MCHC 33.2 2020    MPV 9.3 2020    NEUTOPHILPCT 63.0 2020    LYMPHOPCT 23.6 2020    MONOPCT 10.1 2020    EOSRELPCT 2.5 2020    BASOPCT 0.8 2020    NEUTROABS 3.3 2020    LYMPHSABS 1.2 2020    MONOSABS 0.5 2020    EOSABS 0.1 2020     Lab Results   Component Value Date     2020    K 4.2 2020     2020    CO2 22 2020    ANIONGAP 13 2020    GLUCOSE 70 2020    BUN 12 2020    CREATININE 0.7 2020    LABGLOM >60 2020    GFRAA >60 2020    GFRAA >60 2012    CALCIUM 9.5 2020    PROT 7.5 2020    LABALBU 4.2 2020    AGRATIO 1.3 2020    BILITOT 0.5 2020    ALKPHOS 78 2020    ALT 14 2020    AST 15 2020    GLOB 3.3 2020     Lab Results   Component Value Date    CHOL 221 2020    TRIG 42 2020    HDL 68 2020    LDLCALC 145 2020    LABVLDL 8 2020     Lab Results   Component Value Date    TSHREFLEX 1.99 2020     Lab Results   Component Value Date    IRON 76 2020    TIBC 261 2020    LABIRON 29 2020     Lab Results   Component Value Date    IXFSQGWQ19 336 2020    FOLATE 10.19 2020     Lab Results   Component Value Date    VITD25 14.1 2020     Lab Results   Component Value Date    LABA1C 5.0 2020    EAG 96.8 2020       Patient Active Problem List   Diagnosis    Previous  section-Primary low transverse via pfannenstiel incision    Obesity    Localized swelling, mass, or lump of lower extremity    Anemia complicating pregnancy in second trimester    Polyhydramnios    GBS (group B Streptococcus carrier), +RV culture, currently pregnant     delivery delivered    Morbid obesity with BMI of 45.0-49.9, adult (HCC)    Chronic GERD    Chronic midline low back pain without sciatica    Arthritis of knee, right    Vitamin D deficiency    Mixed hyperlipidemia    Morbid obesity with BMI of 40.0-44.9, adult (HCC)    High risk medications (not anticoagulants) long-term use     Review of Systems   Constitutional: Negative. Negative for chills, fatigue and fever. HENT: Negative. Eyes: Negative. Respiratory: Negative. Negative for cough and shortness of breath. Cardiovascular: Negative. Gastrointestinal: Negative. Negative for constipation. Endocrine: Negative. Genitourinary: Negative. Musculoskeletal: Negative. Skin: Negative. Allergic/Immunologic: Negative. Neurological: Negative. Negative for dizziness, numbness and headaches. Hematological: Negative. Psychiatric/Behavioral: Negative. Negative for sleep disturbance. Physical Exam  Vitals signs reviewed. Constitutional:       Appearance: She is well-developed. HENT:      Head: Normocephalic and atraumatic. Eyes:      Conjunctiva/sclera: Conjunctivae normal.      Pupils: Pupils are equal, round, and reactive to light. Neck:      Musculoskeletal: Normal range of motion and neck supple. Cardiovascular:      Rate and Rhythm: Normal rate. Pulmonary:      Effort: Pulmonary effort is normal.   Abdominal:      Palpations: Abdomen is soft. Musculoskeletal: Normal range of motion. Skin:     General: Skin is warm and dry. Neurological:      Mental Status: She is alert and oriented to person, place, and time. Psychiatric:         Behavior: Behavior normal.         Thought Content:  Thought content normal.         Judgment: Judgment normal.       Assessment and Plan:    Patient is here for their 5th medical weight loss visit, down 3.8 lbs and a total weight loss of 15.6 lbs. The patient's current Body mass index is 43.17 kg/m². (10/12/20). She is doing well, making dietary and behavior modifications. She is following dietary recommendations utilizing the 1200 calorie LCMP. She did meet with the registered dietitian for continued follow up. I agree with recommendations and plan. She is exercising with walking five days per week for 30 minutes. Still has to start [facility]. Encouraged continued physical activity. Discussed risks, benefits and alternatives of Qsymia. Patient meets BMI criteria, confirms negative pregnancy status monthly, denies any significant coronary artery disease, glaucoma,hyperthyroidism, MAOIs within the past 14 days and no known hypersensitivity to the sympathomimetic amines, kidney or liver impairment. EKG previously completed and reviewed. OARRS report completed and reviewed today. Explained to patient that we will monitor their weight loss every 12 weeks and if they have not lost at least 5% of their body weight we will either look to increase the medication or discontinue. If you achieve the 5% weight loss and are taking the 7.5mg/46mg dose we will continue at the same dose and evaluate again in 12 weeks. If you do not achieve the 5% weight loss we can choose to discontinue the medication or increase your dose to the 11.25 mg/69mg for 2 weeks and then increase to the maximum dose of 15 mg/92mg and monitor your weight loss over the next 12 weeks. If you do not achieve the 5% weight loss over the next 12 weeks we will have to discontinue the medication. Counseled patient on proper use and potential side effects. Explained to patient that the maximum dose of this medication will need to be tapered when she is ready to discontinue it. she understands that abrupt cessation of this dose may cause adverse reactions including seizures.  she understands that it is her responsibility to make sure that she does not run out of medications and to follow up to her appointments every 2-4 weeks as recommended. Heavily counseled on the importance of therapeutic lifestyle changes through diet and exercise. The patient's current weight is 259.4 lbs. The goal for the patient in the 12 weeks is a loss of 8.25-13.5 lbs by this visit (Starting weight for this goal is 275 lbs). Currently the patient has lost 15.6 lbs of their 12 week goal. The new goal for the patient in the next 12 weeks is a loss of 7.75-12.5 lbs by their January 2021. Discussed possible side effects including, but not limited to palpitations, irritability, paresthesias, dizziness, dysgeusia, insomnia, constipation and dry mouth. Patient is responsible for keeping their monthly appointments. Failure to comply with their monthly visits will result in discontinuing the Qsymia. The patient also understands that if they are off of the medication for 7 days, the medication will be discontinued and cannot be restarted for six months. The patient understands they are not to drink alcohol while on this medication. Females, it is your responsibility to obtain negative pregnancy tests each month. Discussed with patient to make sure they fill their prescription within at least 7 days of this appointment. We will see her back in 1 month for continued follow up. A total of 25 minutes was spent face-to-face with the patient and over half that time was spent counseling the patient on the medication, proper dietary behaviors and exercise.

## 2020-10-12 ENCOUNTER — OFFICE VISIT (OUTPATIENT)
Dept: BARIATRICS/WEIGHT MGMT | Age: 35
End: 2020-10-12
Payer: COMMERCIAL

## 2020-10-12 VITALS
WEIGHT: 259.4 LBS | HEART RATE: 68 BPM | RESPIRATION RATE: 18 BRPM | BODY MASS INDEX: 43.22 KG/M2 | HEIGHT: 65 IN | SYSTOLIC BLOOD PRESSURE: 138 MMHG | OXYGEN SATURATION: 98 % | DIASTOLIC BLOOD PRESSURE: 94 MMHG

## 2020-10-12 PROCEDURE — G8427 DOCREV CUR MEDS BY ELIG CLIN: HCPCS | Performed by: NURSE PRACTITIONER

## 2020-10-12 PROCEDURE — G8484 FLU IMMUNIZE NO ADMIN: HCPCS | Performed by: NURSE PRACTITIONER

## 2020-10-12 PROCEDURE — 99214 OFFICE O/P EST MOD 30 MIN: CPT | Performed by: NURSE PRACTITIONER

## 2020-10-12 PROCEDURE — 1036F TOBACCO NON-USER: CPT | Performed by: NURSE PRACTITIONER

## 2020-10-12 PROCEDURE — G8417 CALC BMI ABV UP PARAM F/U: HCPCS | Performed by: NURSE PRACTITIONER

## 2020-10-12 RX ORDER — PHENTERMINE AND TOPIRAMATE 7.5; 46 MG/1; MG/1
1 CAPSULE, EXTENDED RELEASE ORAL DAILY
Qty: 30 CAPSULE | Refills: 0 | Status: SHIPPED | OUTPATIENT
Start: 2020-10-12 | End: 2020-11-09

## 2020-10-12 NOTE — PROGRESS NOTES
Adiel Casey lost 3.8 lbs over past month. Tracks intakes inconsistently with [facility]. Treatment plan details: 1200 kcal LC and Qsymia   Is patient adhering to treatment plan: Overall yes    Is pt eating 4-5 times each day? Yes - 4 x day    Is pt including lean protein with all meals and snacks? celery with PB, yogurt, protein bars, protein with meals    Is pt avoiding added sugars and starchy foods? Limited bread    Consuming at least 64oz of calorie free fluids? Most days - Water    Participating in intentional exercise? Jump rope challenge, walking for 45 minutes at home occasionally + 30 minutes at work 5 x week. Interested at starting at gym. Plan/Goals:   Plans to start at the gym - 2 x week  Continue with diet   Preload food choices into [facility] the night before.      Handouts: none    [employer] [place]

## 2020-11-03 ASSESSMENT — ENCOUNTER SYMPTOMS
CONSTIPATION: 0
ALLERGIC/IMMUNOLOGIC NEGATIVE: 1
EYES NEGATIVE: 1
RESPIRATORY NEGATIVE: 1
COUGH: 0
GASTROINTESTINAL NEGATIVE: 1
SHORTNESS OF BREATH: 0

## 2020-11-03 NOTE — PROGRESS NOTES
[facility]) Physicians   [facility] [employer]    Medical Weight Loss    HPI: Adiel Casey is a 29 y.o. female with Body mass index is 42.1 kg/m². Here for medical weight loss. Patient denies any nausea, vomiting, fevers, chills, shortness of breath, chest pain, cough, constipation or difficulty urinating. A little fogginess and forgetfulness, but has been busy and has a lot going on. Past Medical History:   Diagnosis Date    Anemia     taking iron    Back pain     Preeclampsia     UTI (lower urinary tract infection)     Vitamin D deficiency      Past Surgical History:   Procedure Laterality Date    ADENOIDECTOMY       SECTION      NRFHT    LEG SURGERY      bone taken from right hip to surgically repair fracture to RLE.    TONSILLECTOMY       Family History   Problem Relation Age of Onset    Asthma Mother     Diabetes Mother     Diabetes Maternal Aunt     High Blood Pressure Maternal Aunt     Diabetes Maternal Grandmother     Hypertension Father     Diabetes Father      Social History     Tobacco Use    Smoking status: Never Smoker    Smokeless tobacco: Never Used   Substance Use Topics    Alcohol use: No     Comment: occas     I counseled the patient on the importance of not smoking and risks of ETOH. No Known Allergies  Vitals:    20 0818   BP: 120/66   Pulse: 72   Resp: 16   Temp: 97.9 °F (36.6 °C)   TempSrc: Temporal   SpO2: 99%   Weight: 253 lb (114.8 kg)   Height: 5' 5\" (1.651 m)     Body mass index is 42.1 kg/m². Current Outpatient Medications:     Phentermine-Topiramate (QSYMIA) 7.5-46 MG CP24, Take 1 capsule by mouth daily for 30 days. , Disp: 30 capsule, Rfl: 0    vitamin C (ASCORBIC ACID) 500 MG tablet, Take 1 tablet by mouth daily, Disp: 30 tablet, Rfl: 3    topiramate (TOPAMAX) 25 MG tablet, One PO HS for 1 wk, 2 tabs PO HS for 1 wk, 1 PO AM and 2 PO HS for 1 wk 2 PO BID., Disp: 120 tablet, Rfl: 2    levonorgestrel (MIRENA, 52 MG,) IUD 52 mg, 1 visit, down 6.4 lbs and a total weight loss of 22 lbs. The patient's current Body mass index is 42.1 kg/m². (11/9/20). She is doing well, making dietary and behavior modifications. She is following dietary recommendations utilizing the 1200 calorie LCMP. She did meet with the registered dietitian for continued follow up. I agree with recommendations and plan. She is exercising with You Tube videos 3-4 times per week. Encouraged continued physical activity. Discussed risks, benefits and alternatives of Qsymia. Patient meets BMI criteria, confirms negative pregnancy status monthly, denies any significant coronary artery disease, glaucoma,hyperthyroidism, MAOIs within the past 14 days and no known hypersensitivity to the sympathomimetic amines, kidney or liver impairment. EKG previously completed and reviewed. OARRS report completed and reviewed today. Explained to patient that we will monitor their weight loss every 12 weeks and if they have not lost at least 5% of their body weight we will either look to increase the medication or discontinue. If you achieve the 5% weight loss and are taking the 7.5mg/46mg dose we will continue at the same dose and evaluate again in 12 weeks. If you do not achieve the 5% weight loss we can choose to discontinue the medication or increase your dose to the 11.25 mg/69mg for 2 weeks and then increase to the maximum dose of 15 mg/92mg and monitor your weight loss over the next 12 weeks. If you do not achieve the 5% weight loss over the next 12 weeks we will have to discontinue the medication. Counseled patient on proper use and potential side effects. Explained to patient that the maximum dose of this medication will need to be tapered when she is ready to discontinue it. she understands that abrupt cessation of this dose may cause adverse reactions including seizures.  she understands that it is her responsibility to make sure that she does not run out of medications and to follow up to her appointments every 2-4 weeks as recommended. Heavily counseled on the importance of therapeutic lifestyle changes through diet and exercise. The patient's current weight is 253 lbs. The goal for the patient in the 12 weeks is a loss of 7.75-12.5 lbs by their January 2021 visit (Starting weight for this goal is 259.4 lbs). Currently the patient has lost 6.4 lbs of their 12 week goal.     Discussed possible side effects including, but not limited to palpitations, irritability, paresthesias, dizziness, dysgeusia, insomnia, constipation and dry mouth. Patient is responsible for keeping their monthly appointments. Failure to comply with their monthly visits will result in discontinuing the Qsymia. The patient also understands that if they are off of the medication for 7 days, the medication will be discontinued and cannot be restarted for six months. The patient understands they are not to drink alcohol while on this medication. Females, it is your responsibility to obtain negative pregnancy tests each month. Discussed with patient to make sure they fill their prescription within at least 7 days of this appointment. We will see her back in 1 month for continued follow up. A total of 25 minutes was spent face-to-face with the patient and over half that time was spent counseling the patient on the medication, proper dietary behaviors and exercise.

## 2020-11-03 NOTE — PATIENT INSTRUCTIONS
Key dietary points:     You should be eating protein at every meal and snack.  Protein is typically found in animal sources, i.e. chicken, lean beef, lean pork, fish, seafood and eggs. It is also found in low-fat dairy sources such as skim or 1% milk, low-fat yogurt, low-fat cheese, and low-fat cottage cheese. Plant based sources of protein include peanut butter, beans, nuts, seeds, hummus and soy.  Meats (preferably organic or grass fed) are great sources of protein and have no carbohydrates.  It is suggested to use coconut, olive, avocado, or almond oils. Choose vegetables that grow above ground as they are generally lower in carbohydrates and higher in fiber.  Avoid starches such as bread, rice, potatoes, pasta and all sources of simple sugars (desserts, soda, breakfast cereals). Choose beverages that are low in calories and sugar.  You should be drinking 64 ounces of low calorie (5 calories or less per serving) fluids per day. Suggestions include:  o Water (you may add fresh fruit, lemon, lime, cucumber or mint)  o Crystal Light  o Majestic Liquid Water Enhancer  o Propel Zero  o Powerade Zero/Gatorade Zero  o Isopure  o [id number]  o SOBE Lifewater Zero  o Vitamin Water Zero  o Sugar Free Issa-Aid    You should be eating 4-5 times per day.  Three small meals plus 1-2 snacks per day is your goal. This balances your calories and nutrients evenly throughout the day and helps to boost your metabolism. Refer to the snack list provided at your initial visit. You should be utilizing the 9-inch plate method.  Eating on a smaller plate will help you control portion sizes, but what you put on your plate counts. Make ¼ of your plate lean protein, ¼ carbohydrate (fruit or dairy) and ½ the plate non-starchy vegetables. You should be reducing added fat and sugar in your diet.  Frying foods adds too much fat and calories, but you could use an air fryer as it requires significantly less oil.  Baking, broiling, or grilling meats add flavor without unhealthy fats. Using cooking oil spray and spray butter products are also healthy options that will aid in your weight loss. Foods high in added sugars are often also high in calories and low in nutrients. Focusing on healthier eating habits will help you manage your weight long-term. Everyone's eating habits are often so ingrained that it can be difficult to change. It is important to maintain consistency when changing behaviors for them to become your new normal way of doing things. It is going to be challenging, but you wouldn't be here if you didn't want to change. Remember that overall health, age, and genetics make each person's weight loss progress different. Do not compare your progress to other patients and make sure you are following our recommendations for long-term success. Explained to patient that we will monitor their weight loss every 12 weeks and if they have not lost at least 5% of their body weight we will either look to increase the medication or discontinue. If you achieve the 5% weight loss and are taking the 7.5mg/46mg dose we will continue at the same dose and evaluate again in 12 weeks. If you do not achieve the 5% weight loss we can choose to discontinue the medication or increase your dose to the 11.25 mg/69mg for 2 weeks and then increase to the maximum dose of 15 mg/92mg and monitor your weight loss over the next 12 weeks. If you do not achieve the 5% weight loss over the next 12 weeks we will have to discontinue the medication. Counseled patient on proper use and potential side effects. Explained to patient that the maximum dose of this medication will need to be tapered when she is ready to discontinue it. she understands that abrupt cessation of this dose may cause adverse reactions including seizures.  she understands that it is her responsibility to make sure that she does not run out of medications and to follow up to her appointments every 2-4 weeks as recommended. Heavily counseled on the importance of therapeutic lifestyle changes through diet and exercise. The patient's current weight is 253 lbs. The goal for the patient in the 12 weeks is a loss of 7.75-12.5 lbs by their January 2021 visit (Starting weight for this goal is 259.4 lbs). Currently the patient has lost 6.4 lbs of their 12 week goal.     Discussed possible side effects including, but not limited to palpitations, irritability, paresthesias, dizziness, dysgeusia, insomnia, constipation and dry mouth. Patient is responsible for keeping their monthly appointments. Failure to comply with their monthly visits will result in discontinuing the Qsymia. The patient also understands that if they are off of the medication for 7 days, the medication will be discontinued and cannot be restarted for six months. The patient understands they are not to drink alcohol while on this medication. Females, it is your responsibility to obtain negative pregnancy tests each month. Discussed with patient to make sure they fill their prescription within at least 7 days of this appointment. Patient received dietary handouts and education.       7/29/20-8/12/20 down 6 lbs  8/12/20-9/14/20 down 5.8 lbs  9/14/20-10/12/20 down 3.8 lbs  10/12/20-11/9/20 down 6.4 lbs   Total of 22 lbs with starting weight of 275 lbs

## 2020-11-09 ENCOUNTER — OFFICE VISIT (OUTPATIENT)
Dept: BARIATRICS/WEIGHT MGMT | Age: 35
End: 2020-11-09
Payer: COMMERCIAL

## 2020-11-09 VITALS
WEIGHT: 253 LBS | DIASTOLIC BLOOD PRESSURE: 66 MMHG | BODY MASS INDEX: 42.15 KG/M2 | SYSTOLIC BLOOD PRESSURE: 120 MMHG | OXYGEN SATURATION: 99 % | HEART RATE: 72 BPM | HEIGHT: 65 IN | TEMPERATURE: 97.9 F | RESPIRATION RATE: 16 BRPM

## 2020-11-09 PROCEDURE — G8427 DOCREV CUR MEDS BY ELIG CLIN: HCPCS | Performed by: NURSE PRACTITIONER

## 2020-11-09 PROCEDURE — G8484 FLU IMMUNIZE NO ADMIN: HCPCS | Performed by: NURSE PRACTITIONER

## 2020-11-09 PROCEDURE — 1036F TOBACCO NON-USER: CPT | Performed by: NURSE PRACTITIONER

## 2020-11-09 PROCEDURE — 99214 OFFICE O/P EST MOD 30 MIN: CPT | Performed by: NURSE PRACTITIONER

## 2020-11-09 PROCEDURE — G8417 CALC BMI ABV UP PARAM F/U: HCPCS | Performed by: NURSE PRACTITIONER

## 2020-11-09 RX ORDER — PHENTERMINE AND TOPIRAMATE 7.5; 46 MG/1; MG/1
1 CAPSULE, EXTENDED RELEASE ORAL DAILY
Qty: 30 CAPSULE | Refills: 0 | Status: SHIPPED | OUTPATIENT
Start: 2020-11-09 | End: 2020-12-09

## 2020-11-09 NOTE — PROGRESS NOTES
Portillo Pineda lost 6.4 lbs over past month. Tracks with [facility]. Treatment plan details: 1200 kcal and LC   Is patient adhering to treatment plan: Yes    Is pt eating 4-5 times each day? Yes - breakfast - yogurt, lunch - smoothie blake 32 oz smoothie with strawberry/pineapple/spinach (400 calories, 29 g/pro) with chicken on the side, dinner - protein and veggie - sometimes has another smoothie at dinner    Is pt including lean protein with all meals and snacks? Yes     Is pt avoiding added sugars and starchy foods? Yes     Consuming at least 64oz of calorie free fluids? 48 oz - mostly drinking water     Participating in intentional exercise? Loku exercises 3-4 x week for 20-30 minutes    Plan/Goals:    Increase fluids to 64 oz/day   Increase exercise to 30 minutes  Monitor liquid calories and continue to track intakes     Handouts: none    Natalie Harvey

## 2020-12-03 ASSESSMENT — ENCOUNTER SYMPTOMS
EYES NEGATIVE: 1
CONSTIPATION: 0
GASTROINTESTINAL NEGATIVE: 1
ALLERGIC/IMMUNOLOGIC NEGATIVE: 1
RESPIRATORY NEGATIVE: 1

## 2020-12-03 NOTE — PATIENT INSTRUCTIONS
Key dietary points:     You should be eating protein at every meal and snack.  Protein is typically found in animal sources, i.e. chicken, lean beef, lean pork, fish, seafood and eggs. It is also found in low-fat dairy sources such as skim or 1% milk, low-fat yogurt, low-fat cheese, and low-fat cottage cheese. Plant based sources of protein include peanut butter, beans, nuts, seeds, hummus and soy.  Meats (preferably organic or grass fed) are great sources of protein and have no carbohydrates.  It is suggested to use coconut, olive, avocado, or almond oils. Choose vegetables that grow above ground as they are generally lower in carbohydrates and higher in fiber.  Avoid starches such as bread, rice, potatoes, pasta and all sources of simple sugars (desserts, soda, breakfast cereals). Choose beverages that are low in calories and sugar.  You should be drinking 64 ounces of low calorie (5 calories or less per serving) fluids per day. Suggestions include:  o Water (you may add fresh fruit, lemon, lime, cucumber or mint)  o Crystal Light  o [employer] Liquid Water Enhancer  o Propel Zero  o Powerade Zero/Gatorade Zero  o Isopure  o [id number]  o SOBE Lifewater Zero  o Vitamin Water Zero  o Sugar Free Issa-Aid    You should be eating 4-5 times per day.  Three small meals plus 1-2 snacks per day is your goal. This balances your calories and nutrients evenly throughout the day and helps to boost your metabolism. Refer to the snack list provided at your initial visit. You should be utilizing the 9-inch plate method.  Eating on a smaller plate will help you control portion sizes, but what you put on your plate counts. Make ¼ of your plate lean protein, ¼ carbohydrate (fruit or dairy) and ½ the plate non-starchy vegetables. You should be reducing added fat and sugar in your diet.  Frying foods adds too much fat and calories, but you could use an air fryer as it requires significantly less oil.  Baking,

## 2020-12-03 NOTE — PROGRESS NOTES
[facility]) Physicians   [facility]    2020    TELEHEALTH EVALUATION -- Audio/Visual (During OZHDA-41 public health emergency)    Medical Weight Loss    HPI: Christina Gracia is a 28 y.o. female with Body mass index is 41.27 kg/m². Due to the COVID-19 restrictions on close contact interactions the patient's monthly visit was conducted via audio/video in saundra of a face to face visit. Patient has consented to have this visit conducted via audio/video and I am conducting it from the office. The patient is here through telemedicine for their medical weight loss visit. Patient denies any nausea, vomiting, fevers, chills, shortness of breath, chest pain, cough, constipation or difficulty urinating. Past Medical History:   Diagnosis Date    Anemia     taking iron    Back pain     Preeclampsia     UTI (lower urinary tract infection)     Vitamin D deficiency      Past Surgical History:   Procedure Laterality Date    ADENOIDECTOMY       SECTION      NRFHT    LEG SURGERY      bone taken from right hip to surgically repair fracture to RLE.    TONSILLECTOMY       Family History   Problem Relation Age of Onset    Asthma Mother     Diabetes Mother     Diabetes Maternal Aunt     High Blood Pressure Maternal Aunt     Diabetes Maternal Grandmother     Hypertension Father     Diabetes Father      Social History     Tobacco Use    Smoking status: Never Smoker    Smokeless tobacco: Never Used   Substance Use Topics    Alcohol use: No     Comment: occas     I counseled the patient on the importance of not smoking and risks of ETOH. No Known Allergies  Vitals:    20 1300   Weight: 248 lb (112.5 kg)   Height: 5' 5\" (1.651 m)     Body mass index is 41.27 kg/m². Current Outpatient Medications:     Phentermine-Topiramate (QSYMIA) 7.5-46 MG CP24, Take 1 capsule by mouth daily for 30 days. , Disp: 30 capsule, Rfl: 0    vitamin C (ASCORBIC ACID) 500 MG tablet, Take 1 tablet Obesity    Localized swelling, mass, or lump of lower extremity    Anemia complicating pregnancy in second trimester    Polyhydramnios    GBS (group B Streptococcus carrier), +RV culture, currently pregnant     delivery delivered    Morbid obesity with BMI of 45.0-49.9, adult (HCC)    Chronic GERD    Chronic midline low back pain without sciatica    Arthritis of knee, right    Vitamin D deficiency    Mixed hyperlipidemia    Morbid obesity with BMI of 40.0-44.9, adult (HCC)    High risk medications (not anticoagulants) long-term use     Review of Systems   Constitutional: Negative. HENT: Negative. Eyes: Negative. Respiratory: Negative. Cardiovascular: Negative. Gastrointestinal: Negative. Negative for constipation. Endocrine: Negative. Genitourinary: Negative. Musculoskeletal: Negative. Skin: Negative. Allergic/Immunologic: Negative. Neurological: Negative. Negative for dizziness, numbness and headaches. Hematological: Negative. Psychiatric/Behavioral: Negative. Negative for sleep disturbance. PHYSICAL EXAMINATION:    Constitutional: [x] Appears well-developed and well-nourished [x] No apparent distress      [] Abnormal-   Mental status  [x] Alert and awake  [x] Oriented to person/place/time [x]Able to follow commands      Eyes:  EOM    [x]  Normal  [] Abnormal-  Sclera  [x]  Normal  [] Abnormal -         Discharge [x]  None visible  [] Abnormal -    HENT:   [x] Normocephalic, atraumatic.   [] Abnormal     Neck: [x] No visualized mass     Pulmonary/Chest: [x] Respiratory effort normal.  [x] No visualized signs of difficulty breathing or respiratory distress        [] Abnormal-      Musculoskeletal:   [x] Normal gait with no signs of ataxia         [x] Normal range of motion of neck        [] Abnormal-     Neurological:        [] No Facial Asymmetry (Cranial nerve 7 motor function) (limited exam to video visit)          [x] No gaze palsy        [] Abnormal-         Skin:        [x] No significant exanthematous lesions or discoloration noted on facial skin         [] Abnormal-            Psychiatric:       [x] Normal Affect [x] No Hallucinations        [] Abnormal-     Other pertinent observable physical exam findings-     Due to this being a TeleHealth encounter, evaluation of the following organ systems is limited: Vitals/Constitutional/EENT/Resp/CV/GI//MS/Neuro/Skin/Heme-Lymph-Imm. Assessment and Plan:    Patient is here via telemedicine for their 7th medical weight loss visit, down 5 lbs and a total weight loss of 27 lbs. The patient's current Body mass index is 41.27 kg/m². (12/9/20). She is doing well, making dietary and behavior modifications. She is following dietary recommendations utilizing the 1200 calorie LCMP. She did speak with the registered dietitian for continued follow up. I agree with recommendations and plan. She has been limited with exercising as she is in the process of moving. Encouraged physical activity as able. Discussed risks, benefits and alternatives of Qsymia. Patient meets BMI criteria, confirms negative pregnancy status monthly, denies any significant coronary artery disease, glaucoma,hyperthyroidism, MAOIs within the past 14 days and no known hypersensitivity to the sympathomimetic amines, kidney or liver impairment. EKG previously completed and reviewed. OARRS report completed and reviewed today. Explained to patient that we will monitor their weight loss every 12 weeks and if they have not lost at least 5% of their body weight we will either look to increase the medication or discontinue. If you achieve the 5% weight loss and are taking the 7.5mg/46mg dose we will continue at the same dose and evaluate again in 12 weeks.  If you do not achieve the 5% weight loss we can choose to discontinue the medication or increase your dose to the 11.25 mg/69mg for 2 weeks and then increase to the maximum dose of 15 mg/92mg and monitor your weight loss over the next 12 weeks. If you do not achieve the 5% weight loss over the next 12 weeks we will have to discontinue the medication. Counseled patient on proper use and potential side effects. Explained to patient that the maximum dose of this medication will need to be tapered when she is ready to discontinue it. she understands that abrupt cessation of this dose may cause adverse reactions including seizures. she understands that it is her responsibility to make sure that she does not run out of medications and to follow up to her appointments every 2-4 weeks as recommended. Heavily counseled on the importance of therapeutic lifestyle changes through diet and exercise. The patient's current weight is 248 lbs. The goal for the patient in the 12 weeks is a loss of 7.75-12.5 lbs by their January 2021 visit (Starting weight for this goal is 259.4 lbs lbs). Currently the patient has lost 11.4 lbs of their 12 week goal.     Discussed possible side effects including, but not limited to palpitations, irritability, paresthesias, dizziness, dysgeusia, insomnia, constipation and dry mouth. Patient is responsible for keeping their monthly appointments. Failure to comply with their monthly visits will result in discontinuing the Qsymia. The patient also understands that if they are off of the medication for 7 days, the medication will be discontinued and cannot be restarted for six months. The patient understands they are not to drink alcohol while on this medication. Females, it is your responsibility to obtain negative pregnancy tests each month. Discussed with patient to make sure they fill their prescription within at least 7 days of this appointment. We will see her back in 1 month for continued follow up or via telemedicine depending on COVID-19 restrictions at the time of their next appointment.  A total of 15 minutes was spent conversing with the patient and over half that time was spent

## 2020-12-09 ENCOUNTER — TELEMEDICINE (OUTPATIENT)
Dept: BARIATRICS/WEIGHT MGMT | Age: 35
End: 2020-12-09
Payer: COMMERCIAL

## 2020-12-09 VITALS — BODY MASS INDEX: 41.32 KG/M2 | WEIGHT: 248 LBS | HEIGHT: 65 IN

## 2020-12-09 PROCEDURE — G8417 CALC BMI ABV UP PARAM F/U: HCPCS | Performed by: NURSE PRACTITIONER

## 2020-12-09 PROCEDURE — 99213 OFFICE O/P EST LOW 20 MIN: CPT | Performed by: NURSE PRACTITIONER

## 2020-12-09 PROCEDURE — G8427 DOCREV CUR MEDS BY ELIG CLIN: HCPCS | Performed by: NURSE PRACTITIONER

## 2020-12-09 PROCEDURE — 1036F TOBACCO NON-USER: CPT | Performed by: NURSE PRACTITIONER

## 2020-12-09 PROCEDURE — G8484 FLU IMMUNIZE NO ADMIN: HCPCS | Performed by: NURSE PRACTITIONER

## 2020-12-09 RX ORDER — PHENTERMINE AND TOPIRAMATE 7.5; 46 MG/1; MG/1
1 CAPSULE, EXTENDED RELEASE ORAL DAILY
Qty: 30 CAPSULE | Refills: 0 | Status: SHIPPED | OUTPATIENT
Start: 2020-12-09 | End: 2021-01-04

## 2021-01-02 ASSESSMENT — ENCOUNTER SYMPTOMS
RESPIRATORY NEGATIVE: 1
COUGH: 0
ALLERGIC/IMMUNOLOGIC NEGATIVE: 1
EYES NEGATIVE: 1
SHORTNESS OF BREATH: 0

## 2021-01-02 NOTE — PROGRESS NOTES
[facility]) Physicians   [employer] [facility] [employer]    Medical Weight Loss    HPI: Luis Julian is a 28 y.o. female with Body mass index is 42.1 kg/m². Here for medical weight loss. Patient denies any nausea, vomiting, fevers, chills, shortness of breath, chest pain, cough, constipation or difficulty urinating. Past Medical History:   Diagnosis Date    Anemia     taking iron    Back pain     Preeclampsia     UTI (lower urinary tract infection)     Vitamin D deficiency      Past Surgical History:   Procedure Laterality Date    ADENOIDECTOMY       SECTION      NRFHT    LEG SURGERY      bone taken from right hip to surgically repair fracture to RLE.    TONSILLECTOMY       Family History   Problem Relation Age of Onset    Asthma Mother     Diabetes Mother     Diabetes Maternal Aunt     High Blood Pressure Maternal Aunt     Diabetes Maternal Grandmother     Hypertension Father     Diabetes Father      Social History     Tobacco Use    Smoking status: Never Smoker    Smokeless tobacco: Never Used   Substance Use Topics    Alcohol use: No     Comment: occas     I counseled the patient on the importance of not smoking and risks of ETOH. No Known Allergies  Vitals:    21 0843   BP: 130/88   Pulse: 78   Resp: 16   Temp: 97 °F (36.1 °C)   TempSrc: Temporal   Weight: 253 lb (114.8 kg)   Height: 5' 5\" (1.651 m)     Body mass index is 42.1 kg/m². Current Outpatient Medications:     Phentermine-Topiramate (QSYMIA) 7.5-46 MG CP24, Take 1 capsule by mouth daily for 30 days. , Disp: 30 capsule, Rfl: 0    vitamin C (ASCORBIC ACID) 500 MG tablet, Take 1 tablet by mouth daily, Disp: 30 tablet, Rfl: 3    levonorgestrel (MIRENA, 52 MG,) IUD 52 mg, 1 each by Intrauterine route once, Disp: , Rfl:     ferrous sulfate (FE TABS) 325 (65 FE) MG EC tablet, Take 1 tablet by mouth 2 times daily. , Disp: 60 tablet, Rfl: 11    Lab Results   Component Value Date    WBC 5.3 2020 RBC 4.64 2020    HGB 13.7 2020    HCT 41.4 2020    MCV 89.3 2020    MCH 29.6 2020    MCHC 33.2 2020    MPV 9.3 2020    NEUTOPHILPCT 63.0 2020    LYMPHOPCT 23.6 2020    MONOPCT 10.1 2020    EOSRELPCT 2.5 2020    BASOPCT 0.8 2020    NEUTROABS 3.3 2020    LYMPHSABS 1.2 2020    MONOSABS 0.5 2020    EOSABS 0.1 2020     Lab Results   Component Value Date     2020    K 4.2 2020     2020    CO2 22 2020    ANIONGAP 13 2020    GLUCOSE 70 2020    BUN 12 2020    CREATININE 0.7 2020    LABGLOM >60 2020    GFRAA >60 2020    GFRAA >60 2012    CALCIUM 9.5 2020    PROT 7.5 2020    LABALBU 4.2 2020    AGRATIO 1.3 2020    BILITOT 0.5 2020    ALKPHOS 78 2020    ALT 14 2020    AST 15 2020    GLOB 3.3 2020     Lab Results   Component Value Date    CHOL 221 2020    TRIG 42 2020    HDL 68 2020    LDLCALC 145 2020    LABVLDL 8 2020     Lab Results   Component Value Date    TSHREFLEX 1.99 2020     Lab Results   Component Value Date    IRON 76 2020    TIBC 261 2020    LABIRON 29 2020     Lab Results   Component Value Date    CCDXAYYV58 336 2020    FOLATE 10.19 2020     Lab Results   Component Value Date    VITD25 14.1 2020     Lab Results   Component Value Date    LABA1C 5.0 2020    EAG 96.8 2020       Patient Active Problem List   Diagnosis    Previous  section-Primary low transverse via pfannenstiel incision    Obesity    Localized swelling, mass, or lump of lower extremity    Anemia complicating pregnancy in second trimester    Polyhydramnios    GBS (group B Streptococcus carrier), +RV culture, currently pregnant     delivery delivered    Morbid obesity with BMI of 45.0-49.9, adult ([facility], [place] [address]    Chronic GERD  Chronic midline low back pain without sciatica    Arthritis of knee, right    Vitamin D deficiency    Mixed hyperlipidemia    Morbid obesity with BMI of 40.0-44.9, adult (HCC)    High risk medications (not anticoagulants) long-term use     Review of Systems   Constitutional: Negative. Negative for chills, fatigue and fever. HENT: Negative. Eyes: Negative. Respiratory: Negative. Negative for cough and shortness of breath. Cardiovascular: Negative. Gastrointestinal: Positive for constipation (Intermittent). Endocrine: Negative. Genitourinary: Negative. Musculoskeletal: Negative. Skin: Negative. Allergic/Immunologic: Negative. Neurological: Negative. Negative for dizziness, numbness and headaches. Hematological: Negative. Psychiatric/Behavioral: Negative. Negative for sleep disturbance. Physical Exam  Vitals signs reviewed. Constitutional:       Appearance: She is well-developed. HENT:      Head: Normocephalic and atraumatic. Eyes:      Conjunctiva/sclera: Conjunctivae normal.      Pupils: Pupils are equal, round, and reactive to light. Neck:      Musculoskeletal: Normal range of motion and neck supple. Cardiovascular:      Rate and Rhythm: Normal rate. Pulmonary:      Effort: Pulmonary effort is normal.   Abdominal:      Palpations: Abdomen is soft. Musculoskeletal: Normal range of motion. Skin:     General: Skin is warm and dry. Neurological:      Mental Status: She is alert and oriented to person, place, and time. Psychiatric:         Behavior: Behavior normal.         Thought Content:  Thought content normal.         Judgment: Judgment normal.       Assessment and Plan: Patient is here for their 8th medical weight loss visit, up 5 lbs and a total weight loss of 22 lbs. The patient's current Body mass index is 42.1 kg/m². (1/4/21). She is doing ok, making dietary and behavior modifications. She is somewhat following dietary recommendations utilizing the 1200 calorie LCMP. Patient was little off track during the holidays as well as with moving. She did meet with the registered dietitian for continued follow up. I agree with recommendations and plan. She is exercising with walking dog daily. Encouraged continued physical activity. Discussed risks, benefits and alternatives of Qsymia. Patient meets BMI criteria, confirms negative pregnancy status monthly, denies any significant coronary artery disease, glaucoma,hyperthyroidism, MAOIs within the past 14 days and no known hypersensitivity to the sympathomimetic amines, kidney or liver impairment. EKG previously completed and reviewed. OARRS report completed and reviewed today. Patient aware that this medication is not covered by insurance. In reference to the constipation discussed with patient she could take a stool softener daily such as docusate sodium (Colace) to see if that keeps her more regular with BM's. Explained to patient that we will monitor their weight loss every 12 weeks and if they have not lost at least 5% of their body weight we will either look to increase the medication or discontinue. If you achieve the 5% weight loss and are taking the 7.5mg/46mg dose we will continue at the same dose and evaluate again in 12 weeks. If you do not achieve the 5% weight loss we can choose to discontinue the medication or increase your dose to the 11.25 mg/69mg for 2 weeks and then increase to the maximum dose of 15 mg/92mg and monitor your weight loss over the next 12 weeks. If you do not achieve the 5% weight loss over the next 12 weeks we will have to discontinue the medication. Counseled patient on proper use and potential side effects. Explained to patient that the maximum dose of this medication will need to be tapered when she is ready to discontinue it. she understands that abrupt cessation of this dose may cause adverse reactions including seizures. she understands that it is her responsibility to make sure that she does not run out of medications and to follow up to her appointments every 24 weeks as recommended. Heavily counseled on the importance of therapeutic lifestyle changes through diet and exercise. The patient's current weight is 253 lbs. The goal for the patient in the 12 weeks is a loss of 7.75-12.5 lbs by this visit (Starting weight for this goal is 259.4 lbs). Currently the patient has lost 6.4 lbs of their 12 week goal. The new goal for the patient in the next 12 weeks is a loss of 7.5-12.5 lbs by their April 2021 visit (Starting weight for this goal is 253 lbs). Discussed possible side effects including, but not limited to palpitations, irritability, paresthesias, dizziness, dysgeusia, insomnia, constipation and dry mouth. Patient is responsible for keeping their monthly appointments. Failure to comply with their monthly visits will result in discontinuing the Qsymia. The patient also understands that if they are off of the medication for 7 days, the medication will be discontinued and cannot be restarted for six months. The patient understands they are not to drink alcohol while on this medication. Females, it is your responsibility to obtain negative pregnancy tests each month. Discussed with patient to make sure they fill their prescription within at least 7 days of this appointment. We will see her back in 1 month for continued follow up. I spent a total of 23 minutes on the day of the visit and over half that time was spent counseling the patient on the medication, proper dietary behaviors and exercise.

## 2021-01-02 NOTE — PATIENT INSTRUCTIONS
Key dietary points:     You should be eating protein at every meal and snack. ? Protein is typically found in animal sources, i.e. chicken, lean beef, lean pork, fish, seafood and eggs. It is also found in low-fat dairy sources such as skim or 1% milk, low-fat yogurt, low-fat cheese, and low-fat cottage cheese. Plant based sources of protein include peanut butter, beans, nuts, seeds, hummus and soy. ? Meats (preferably organic or grass fed) are great sources of protein and have no carbohydrates. ? It is suggested to use coconut, olive, avocado, or almond oils. Choose vegetables that grow above ground as they are generally lower in carbohydrates and higher in fiber. ? Avoid starches such as bread, rice, potatoes, pasta and all sources of simple sugars (desserts, soda, breakfast cereals). Choose beverages that are low in calories and sugar. ? You should be drinking 64 ounces of low calorie (5 calories or less per serving) fluids per day. Suggestions include:  o Water (you may add fresh fruit, lemon, lime, cucumber or mint)  o Crystal Light  o [place] Liquid Water Enhancer  o Propel Zero  o Powerade Zero/Gatorade Zero  o Isopure  o Ejjir4I  o SOBE Lifewater Zero  o Vitamin Water Zero  o Sugar Free Issa-Aid    You should be eating 4-5 times per day. ? Three small meals plus 1-2 snacks per day is your goal. This balances your calories and nutrients evenly throughout the day and helps to boost your metabolism. Refer to the snack list provided at your initial visit. You should be utilizing the 9-inch plate method. ? Eating on a smaller plate will help you control portion sizes, but what you put on your plate counts. Make ¼ of your plate lean protein, ¼ carbohydrate (fruit or dairy) and ½ the plate non-starchy vegetables. You should be reducing added fat and sugar in your diet. ? Frying foods adds too much fat and calories, but you could use an air fryer as it requires significantly less oil. Baking, broiling, or grilling meats add flavor without unhealthy fats. Using cooking oil spray and spray butter products are also healthy options that will aid in your weight loss. Foods high in added sugars are often also high in calories and low in nutrients. Focusing on healthier eating habits will help you manage your weight long-term. Everyone's eating habits are often so ingrained that it can be difficult to change. It is important to maintain consistency when changing behaviors for them to become your new normal way of doing things. It is going to be challenging, but you wouldn't be here if you didn't want to change. Remember that overall health, age, and genetics make each person's weight loss progress different. Do not compare your progress to other patients and make sure you are following our recommendations for long-term success. Patient received dietary handouts and education. The patient's current weight is 253 lbs. The goal for the patient in the 12 weeks is a loss of 7.75-12.5 lbs by this visit (Starting weight for this goal is 259.4 lbs). Currently the patient has lost 6.4 lbs of their 12 week goal. The new goal for the patient in the next 12 weeks is a loss of 7.5-12.5 lbs by their April 2021 visit (Starting weight for this goal is 253 lbs). Discussed possible side effects including, but not limited to palpitations, irritability, paresthesias, dizziness, dysgeusia, insomnia, constipation and dry mouth. Patient is responsible for keeping their monthly appointments. Failure to comply with their monthly visits will result in discontinuing the Qsymia. The patient also understands that if they are off of the medication for 7 days, the medication will be discontinued and cannot be restarted for six months. The patient understands they are not to drink alcohol while on this medication. Females, it is your responsibility to obtain negative pregnancy tests each month. Discussed with patient to make sure they fill their prescription within at least 7 days of this appointment.

## 2021-01-04 ENCOUNTER — OFFICE VISIT (OUTPATIENT)
Dept: BARIATRICS/WEIGHT MGMT | Age: 36
End: 2021-01-04
Payer: COMMERCIAL

## 2021-01-04 VITALS
WEIGHT: 253 LBS | TEMPERATURE: 97 F | HEART RATE: 78 BPM | RESPIRATION RATE: 16 BRPM | BODY MASS INDEX: 42.15 KG/M2 | SYSTOLIC BLOOD PRESSURE: 130 MMHG | DIASTOLIC BLOOD PRESSURE: 88 MMHG | HEIGHT: 65 IN

## 2021-01-04 DIAGNOSIS — Z79.899 HIGH RISK MEDICATIONS (NOT ANTICOAGULANTS) LONG-TERM USE: ICD-10-CM

## 2021-01-04 DIAGNOSIS — E66.01 MORBID OBESITY WITH BMI OF 40.0-44.9, ADULT (HCC): ICD-10-CM

## 2021-01-04 DIAGNOSIS — K21.9 CHRONIC GERD: Primary | ICD-10-CM

## 2021-01-04 DIAGNOSIS — E78.2 MIXED HYPERLIPIDEMIA: ICD-10-CM

## 2021-01-04 PROCEDURE — 99213 OFFICE O/P EST LOW 20 MIN: CPT | Performed by: NURSE PRACTITIONER

## 2021-01-04 PROCEDURE — G8417 CALC BMI ABV UP PARAM F/U: HCPCS | Performed by: NURSE PRACTITIONER

## 2021-01-04 PROCEDURE — G8484 FLU IMMUNIZE NO ADMIN: HCPCS | Performed by: NURSE PRACTITIONER

## 2021-01-04 PROCEDURE — 1036F TOBACCO NON-USER: CPT | Performed by: NURSE PRACTITIONER

## 2021-01-04 PROCEDURE — G8427 DOCREV CUR MEDS BY ELIG CLIN: HCPCS | Performed by: NURSE PRACTITIONER

## 2021-01-04 RX ORDER — PHENTERMINE AND TOPIRAMATE 7.5; 46 MG/1; MG/1
1 CAPSULE, EXTENDED RELEASE ORAL DAILY
Qty: 30 CAPSULE | Refills: 0 | Status: SHIPPED | OUTPATIENT
Start: 2021-01-04 | End: 2021-01-04

## 2021-01-04 RX ORDER — PHENTERMINE AND TOPIRAMATE 7.5; 46 MG/1; MG/1
1 CAPSULE, EXTENDED RELEASE ORAL DAILY
Qty: 30 CAPSULE | Refills: 0 | Status: SHIPPED | OUTPATIENT
Start: 2021-01-04 | End: 2021-04-06

## 2021-01-04 ASSESSMENT — ENCOUNTER SYMPTOMS: CONSTIPATION: 1

## 2021-01-04 NOTE — PROGRESS NOTES
Angel Vieira gained 5 lbs over past month. Pt has been moving, so has been eating out more or using frozen meals. Treatment plan details: 1200 kcal LC and Qsymia   Is patient adhering to treatment plan: Somewhat     Is pt eating 4-5 times each day? Eats 2-3 x day     Is pt including lean protein with all meals and snacks? Salad with chicken OR lean cuisine/yoselin calendars frozen meals     Is pt avoiding added sugars and starchy foods? No     Consuming at least 64oz of calorie free fluids? 48-64 oz of water/water flavorings     Participating in intentional exercise?  Walking dog for 30 minutes/day     Plan/Goals:   Meal plan and prep  Continue walking dog  Avoid higher calorie frozen meals - stick to lean cuisine, healthy choice, smartones     Handouts: none     Daniela Rodríguez

## 2021-02-04 PROBLEM — E66.01 MORBID OBESITY WITH BMI OF 45.0-49.9, ADULT (HCC): Status: RESOLVED | Noted: 2020-06-23 | Resolved: 2021-02-04

## 2021-02-04 ASSESSMENT — ENCOUNTER SYMPTOMS
SHORTNESS OF BREATH: 0
EYES NEGATIVE: 1
GASTROINTESTINAL NEGATIVE: 1
COUGH: 0
ALLERGIC/IMMUNOLOGIC NEGATIVE: 1
RESPIRATORY NEGATIVE: 1

## 2021-02-04 NOTE — PROGRESS NOTES
[facility]) Physicians   [facility] [employer]    Medical Weight Loss    HPI: Juan J Anthony is a 28 y.o. female with Body mass index is 41.44 kg/m². Here for medical weight loss. Patient denies any nausea, vomiting, fevers, chills, shortness of breath, chest pain, cough, constipation or difficulty urinating. Past Medical History:   Diagnosis Date    Anemia     taking iron    Back pain     Preeclampsia     UTI (lower urinary tract infection)     Vitamin D deficiency      Past Surgical History:   Procedure Laterality Date    ADENOIDECTOMY       SECTION      NRFHT    LEG SURGERY      bone taken from right hip to surgically repair fracture to RLE.    TONSILLECTOMY       Family History   Problem Relation Age of Onset    Asthma Mother     Diabetes Mother     Diabetes Maternal Aunt     High Blood Pressure Maternal Aunt     Diabetes Maternal Grandmother     Hypertension Father     Diabetes Father      Social History     Tobacco Use    Smoking status: Never Smoker    Smokeless tobacco: Never Used   Substance Use Topics    Alcohol use: No     Comment: occas     I counseled the patient on the importance of not smoking and risks of ETOH. No Known Allergies  Vitals:    21 0818   BP: (!) 136/90   Pulse: 84   Resp: 16   Temp: 96.9 °F (36.1 °C)   TempSrc: Temporal   SpO2: 98%   Weight: 249 lb (112.9 kg)   Height: 5' 5\" (1.651 m)     Body mass index is 41.44 kg/m². Current Outpatient Medications:     Phentermine-Topiramate (QSYMIA) 7.5-46 MG CP24, Take 1 capsule by mouth daily for 30 days. , Disp: 30 capsule, Rfl: 0    vitamin C (ASCORBIC ACID) 500 MG tablet, Take 1 tablet by mouth daily, Disp: 30 tablet, Rfl: 3    levonorgestrel (MIRENA, 52 MG,) IUD 52 mg, 1 each by Intrauterine route once, Disp: , Rfl:     ferrous sulfate (FE TABS) 325 (65 FE) MG EC tablet, Take 1 tablet by mouth 2 times daily. , Disp: 60 tablet, Rfl: 11    Lab Results   Component Value Date WBC 5.3 2020    RBC 4.64 2020    HGB 13.7 2020    HCT 41.4 2020    MCV 89.3 2020    MCH 29.6 2020    MCHC 33.2 2020    MPV 9.3 2020    NEUTOPHILPCT 63.0 2020    LYMPHOPCT 23.6 2020    MONOPCT 10.1 2020    EOSRELPCT 2.5 2020    BASOPCT 0.8 2020    NEUTROABS 3.3 2020    LYMPHSABS 1.2 2020    MONOSABS 0.5 2020    EOSABS 0.1 2020     Lab Results   Component Value Date     2020    K 4.2 2020     2020    CO2 22 2020    ANIONGAP 13 2020    GLUCOSE 70 2020    BUN 12 2020    CREATININE 0.7 2020    LABGLOM >60 2020    GFRAA >60 2020    GFRAA >60 2012    CALCIUM 9.5 2020    PROT 7.5 2020    LABALBU 4.2 2020    AGRATIO 1.3 2020    BILITOT 0.5 2020    ALKPHOS 78 2020    ALT 14 2020    AST 15 2020    GLOB 3.3 2020     Lab Results   Component Value Date    CHOL 221 2020    TRIG 42 2020    HDL 68 2020    LDLCALC 145 2020    LABVLDL 8 2020     Lab Results   Component Value Date    TSHREFLEX 1.99 2020     Lab Results   Component Value Date    IRON 76 2020    TIBC 261 2020    LABIRON 29 2020     Lab Results   Component Value Date    CPGTWTAA80 336 2020    FOLATE 10.19 2020     Lab Results   Component Value Date    VITD25 14.1 2020     Lab Results   Component Value Date    LABA1C 5.0 2020    EAG 96.8 2020       Patient Active Problem List   Diagnosis    Previous  section-Primary low transverse via pfannenstiel incision    Obesity    Localized swelling, mass, or lump of lower extremity    Anemia complicating pregnancy in second trimester    Polyhydramnios    GBS (group B Streptococcus carrier), +RV culture, currently pregnant     delivery delivered    Chronic GERD  Chronic midline low back pain without sciatica    Arthritis of knee, right    Vitamin D deficiency    Mixed hyperlipidemia    Morbid obesity with BMI of 40.0-44.9, adult (HCC)    High risk medications (not anticoagulants) long-term use       Review of Systems   Constitutional: Negative. Negative for chills, fatigue and fever. HENT: Negative. Eyes: Negative. Respiratory: Negative. Negative for cough and shortness of breath. Cardiovascular: Negative. Gastrointestinal: Negative. Negative for constipation (Better taking colace). Endocrine: Negative. Genitourinary: Negative. Musculoskeletal: Negative. Skin: Negative. Allergic/Immunologic: Negative. Neurological: Negative. Negative for dizziness, numbness and headaches. Hematological: Negative. Psychiatric/Behavioral: Negative. Negative for sleep disturbance. Physical Exam  Vitals signs reviewed. Constitutional:       Appearance: She is well-developed. HENT:      Head: Normocephalic and atraumatic. Eyes:      Conjunctiva/sclera: Conjunctivae normal.      Pupils: Pupils are equal, round, and reactive to light. Neck:      Musculoskeletal: Normal range of motion and neck supple. Cardiovascular:      Rate and Rhythm: Normal rate. Pulmonary:      Effort: Pulmonary effort is normal.   Abdominal:      Palpations: Abdomen is soft. Musculoskeletal: Normal range of motion. Skin:     General: Skin is warm and dry. Neurological:      Mental Status: She is alert and oriented to person, place, and time. Psychiatric:         Behavior: Behavior normal.         Thought Content:  Thought content normal.         Judgment: Judgment normal.       Assessment and Plan: Patient is here for their 9th medical weight loss visit, down 4 lbs and a total weight loss of 26 lbs. The patient's current Body mass index is 41.44 kg/m². (2/8/21). She is doing well, making dietary and behavior modifications. She is mostly following dietary recommendations utilizing the 1200 calorie LCMP. Needs to continue to work on the consistency of B/L/D/S and fluids. She did meet with the registered dietitian for continued follow up. I agree with recommendations and plan. She is exercising with walking and doing challenges with her daughter. Encouraged continued physical activity. Discussed risks, benefits and alternatives of Qsymia. Patient meets BMI criteria, confirms negative pregnancy status monthly, denies any significant coronary artery disease, glaucoma,hyperthyroidism, MAOIs within the past 14 days and no known hypersensitivity to the sympathomimetic amines, kidney or liver impairment. EKG previously completed and reviewed. OARRS report completed and reviewed today. Patient aware that this medication is not covered by insurance. Explained to patient that we will monitor their weight loss every 12 weeks and if they have not lost at least 5% of their body weight we will either look to increase the medication or discontinue. If you achieve the 5% weight loss and are taking the 7.5mg/46mg dose we will continue at the same dose and evaluate again in 12 weeks. If you do not achieve the 5% weight loss we can choose to discontinue the medication or increase your dose to the 11.25 mg/69mg for 2 weeks and then increase to the maximum dose of 15 mg/92mg and monitor your weight loss over the next 12 weeks. If you do not achieve the 5% weight loss over the next 12 weeks we will have to discontinue the medication. Counseled patient on proper use and potential side effects. Explained to patient that the maximum dose of this medication will need to be tapered when she is ready to discontinue it. she understands that abrupt cessation of this dose may cause adverse reactions including seizures. she understands that it is her responsibility to make sure that she does not run out of medications and to follow up to her appointments every 24 weeks as recommended. Heavily counseled on the importance of therapeutic lifestyle changes through diet and exercise. The patient's current weight is 249 lbs. The goal for the patient in the 12 weeks is a loss of 7.5-12.5 lbs by their April 2021 visit (Starting weight for this goal is 253 lbs). Currently the patient has lost 4 lbs of their 12 week goal.     Discussed possible side effects including, but not limited to palpitations, irritability, paresthesias, dizziness, dysgeusia, insomnia, constipation and dry mouth. Patient is responsible for keeping their monthly appointments. Failure to comply with their monthly visits will result in discontinuing the Qsymia. The patient also understands that if they are off of the medication for 7 days, the medication will be discontinued and cannot be restarted for six months. The patient understands they are not to drink alcohol while on this medication. Females, it is your responsibility to obtain negative pregnancy tests each month. Discussed with patient to make sure they fill their prescription within at least 7 days of this appointment. We will see her back in 1 month for continued follow up. Total encounter time: 22 minutes, including any number of the following: Bariatric medication protocols, review of labs, imaging, provider notes, outside hospital records, performing examination/evaluation, counseling patient and/or family, ordering medications/tests, placing referrals and communication with referring physicians, coordination of care; discussing exercise and physical activity; discussing dietary plan/recall with the patient as well with registered dietitian and documentation in the EHR. Of note, the above was done during same day of the actual patient encounter.

## 2021-02-04 NOTE — PATIENT INSTRUCTIONS
Key dietary points:     You should be eating protein at every meal and snack. ? Protein is typically found in animal sources, i.e. chicken, lean beef, lean pork, fish, seafood and eggs. It is also found in low-fat dairy sources such as skim or 1% milk, low-fat yogurt, low-fat cheese, and low-fat cottage cheese. Plant based sources of protein include peanut butter, beans, nuts, seeds, hummus and soy. ? Meats (preferably organic or grass fed) are great sources of protein and have no carbohydrates. ? It is suggested to use coconut, olive, avocado, or almond oils. Choose vegetables that grow above ground as they are generally lower in carbohydrates and higher in fiber. ? Avoid starches such as bread, rice, potatoes, pasta and all sources of simple sugars (desserts, soda, breakfast cereals). Choose beverages that are low in calories and sugar. ? You should be drinking 64 ounces of low calorie (5 calories or less per serving) fluids per day. Suggestions include:  o Water (you may add fresh fruit, lemon, lime, cucumber or mint)  o Crystal Light  o [place] Liquid Water Enhancer  o Propel Zero  o Powerade Zero/Gatorade Zero  o Isopure  o [id number]  o SOBE Lifewater Zero  o Vitamin Water Zero  o Sugar Free Issa-Aid    You should be eating 4-5 times per day. ? Three small meals plus 1-2 snacks per day is your goal. This balances your calories and nutrients evenly throughout the day and helps to boost your metabolism. Refer to the snack list provided at your initial visit. You should be utilizing the 9-inch plate method. ? Eating on a smaller plate will help you control portion sizes, but what you put on your plate counts. Make ¼ of your plate lean protein, ¼ carbohydrate (fruit or dairy) and ½ the plate non-starchy vegetables. You should be reducing added fat and sugar in your diet. ? Frying foods adds too much fat and calories, but you could use an air fryer as it requires significantly less oil. Baking, broiling, or grilling meats add flavor without unhealthy fats. Using cooking oil spray and spray butter products are also healthy options that will aid in your weight loss. Foods high in added sugars are often also high in calories and low in nutrients. Focusing on healthier eating habits will help you manage your weight long-term. Everyone's eating habits are often so ingrained that it can be difficult to change. It is important to maintain consistency when changing behaviors for them to become your new normal way of doing things. It is going to be challenging, but you wouldn't be here if you didn't want to change. Remember that overall health, age, and genetics make each person's weight loss progress different. Do not compare your progress to other patients and make sure you are following our recommendations for long-term success. Patient is responsible for keeping their monthly appointments. Failure to comply with their monthly visits will result in discontinuing the Qsymia. The patient also understands that if they are off of the medication for 7 days, the medication will be discontinued and cannot be restarted for six months. The patient understands they are not to drink alcohol while on this medication. Females, it is your responsibility to obtain negative pregnancy tests each month. Discussed with patient to make sure they fill their prescription within at least 7 days of this appointment.

## 2021-02-08 ENCOUNTER — OFFICE VISIT (OUTPATIENT)
Dept: BARIATRICS/WEIGHT MGMT | Age: 36
End: 2021-02-08
Payer: COMMERCIAL

## 2021-02-08 VITALS
SYSTOLIC BLOOD PRESSURE: 136 MMHG | WEIGHT: 249 LBS | RESPIRATION RATE: 16 BRPM | HEIGHT: 65 IN | HEART RATE: 84 BPM | BODY MASS INDEX: 41.48 KG/M2 | OXYGEN SATURATION: 98 % | DIASTOLIC BLOOD PRESSURE: 90 MMHG | TEMPERATURE: 96.9 F

## 2021-02-08 DIAGNOSIS — E66.01 MORBID OBESITY WITH BMI OF 40.0-44.9, ADULT (HCC): ICD-10-CM

## 2021-02-08 DIAGNOSIS — K21.9 CHRONIC GERD: Primary | ICD-10-CM

## 2021-02-08 DIAGNOSIS — Z79.899 HIGH RISK MEDICATIONS (NOT ANTICOAGULANTS) LONG-TERM USE: ICD-10-CM

## 2021-02-08 DIAGNOSIS — E78.2 MIXED HYPERLIPIDEMIA: ICD-10-CM

## 2021-02-08 PROCEDURE — 1036F TOBACCO NON-USER: CPT | Performed by: NURSE PRACTITIONER

## 2021-02-08 PROCEDURE — G8427 DOCREV CUR MEDS BY ELIG CLIN: HCPCS | Performed by: NURSE PRACTITIONER

## 2021-02-08 PROCEDURE — G8417 CALC BMI ABV UP PARAM F/U: HCPCS | Performed by: NURSE PRACTITIONER

## 2021-02-08 PROCEDURE — G8484 FLU IMMUNIZE NO ADMIN: HCPCS | Performed by: NURSE PRACTITIONER

## 2021-02-08 PROCEDURE — 99213 OFFICE O/P EST LOW 20 MIN: CPT | Performed by: NURSE PRACTITIONER

## 2021-02-08 RX ORDER — PHENTERMINE AND TOPIRAMATE 7.5; 46 MG/1; MG/1
1 CAPSULE, EXTENDED RELEASE ORAL DAILY
Qty: 30 CAPSULE | Refills: 0 | Status: SHIPPED | OUTPATIENT
Start: 2021-02-08 | End: 2021-03-08

## 2021-02-08 ASSESSMENT — ENCOUNTER SYMPTOMS: CONSTIPATION: 0

## 2021-02-08 NOTE — PROGRESS NOTES
Tamiko Patrick lost 4 lbs over 1 month. Treatment plan details: 1200kcal LCMP + Qysmia  Is patient adhering to treatment plan: Trying to stick with meal plan, does find it more difficult to track during week at work    Is pt eating 4-5 times each day? 4 days over course of week, 3x the rest of week    Is pt including lean protein with all meals and snacks? Yes    Is pt avoiding added sugars and starchy foods? Is avoiding - has substituted with celery and PB, and occasional fruit    Consuming at least 64oz of calorie free fluids? no, ~ 50oz of water, coffee 1x week    Participating in intentional exercise?  Trying to do different challenges with daughter (sit-ups / squats / jumping yannick) , walking dog 2x day 10-15min each time    Plan/Goals:   - Consistent with eating 4x day - incorporating breakfast into meal plan  - Increase fluids to 64oz  - Continue with physical activity, increasing duration and intensity as tolerated    Handouts: none3    Manuel Lewis

## 2021-03-04 ASSESSMENT — ENCOUNTER SYMPTOMS
ALLERGIC/IMMUNOLOGIC NEGATIVE: 1
RESPIRATORY NEGATIVE: 1
GASTROINTESTINAL NEGATIVE: 1
SHORTNESS OF BREATH: 0
COUGH: 0
EYES NEGATIVE: 1

## 2021-03-04 NOTE — PROGRESS NOTES
[facility]) Physicians   [facility]    Medical Weight Loss    HPI: Keenan Chavez is a 28 y.o. female with Body mass index is 41.44 kg/m². Here for medical weight loss. Patient denies any nausea, vomiting, fevers, chills, shortness of breath, chest pain, cough, constipation or difficulty urinating. Past Medical History:   Diagnosis Date    Anemia     taking iron    Back pain     Preeclampsia     UTI (lower urinary tract infection)     Vitamin D deficiency      Past Surgical History:   Procedure Laterality Date    ADENOIDECTOMY       SECTION      NRFHT    LEG SURGERY      bone taken from right hip to surgically repair fracture to RLE.    TONSILLECTOMY       Family History   Problem Relation Age of Onset    Asthma Mother     Diabetes Mother     Diabetes Maternal Aunt     High Blood Pressure Maternal Aunt     Diabetes Maternal Grandmother     Hypertension Father     Diabetes Father      Social History     Tobacco Use    Smoking status: Never Smoker    Smokeless tobacco: Never Used   Substance Use Topics    Alcohol use: No     Comment: occas     I counseled the patient on the importance of not smoking and risks of ETOH. No Known Allergies  Vitals:    21 0814   BP: 136/88   Pulse: 83   Resp: 16   Temp: 97.1 °F (36.2 °C)   TempSrc: Temporal   SpO2: 98%   Weight: 249 lb (112.9 kg)   Height: 5' 5\" (1.651 m)     Body mass index is 41.44 kg/m². Current Outpatient Medications:     Phentermine-Topiramate (QSYMIA) 7.5-46 MG CP24, Take 1 capsule by mouth daily for 30 days. , Disp: 30 capsule, Rfl: 0    vitamin C (ASCORBIC ACID) 500 MG tablet, Take 1 tablet by mouth daily, Disp: 30 tablet, Rfl: 3    levonorgestrel (MIRENA, 52 MG,) IUD 52 mg, 1 each by Intrauterine route once, Disp: , Rfl:     ferrous sulfate (FE TABS) 325 (65 FE) MG EC tablet, Take 1 tablet by mouth 2 times daily. , Disp: 60 tablet, Rfl: 11    Lab Results   Component Value Date    WBC 5.3 2020 RBC 4.64 2020    HGB 13.7 2020    HCT 41.4 2020    MCV 89.3 2020    MCH 29.6 2020    MCHC 33.2 2020    MPV 9.3 2020    NEUTOPHILPCT 63.0 2020    LYMPHOPCT 23.6 2020    MONOPCT 10.1 2020    EOSRELPCT 2.5 2020    BASOPCT 0.8 2020    NEUTROABS 3.3 2020    LYMPHSABS 1.2 2020    MONOSABS 0.5 2020    EOSABS 0.1 2020     Lab Results   Component Value Date     2020    K 4.2 2020     2020    CO2 22 2020    ANIONGAP 13 2020    GLUCOSE 70 2020    BUN 12 2020    CREATININE 0.7 2020    LABGLOM >60 2020    GFRAA >60 2020    GFRAA >60 2012    CALCIUM 9.5 2020    PROT 7.5 2020    LABALBU 4.2 2020    AGRATIO 1.3 2020    BILITOT 0.5 2020    ALKPHOS 78 2020    ALT 14 2020    AST 15 2020    GLOB 3.3 2020     Lab Results   Component Value Date    CHOL 221 2020    TRIG 42 2020    HDL 68 2020    LDLCALC 145 2020    LABVLDL 8 2020     Lab Results   Component Value Date    TSHREFLEX 1.99 2020     Lab Results   Component Value Date    IRON 76 2020    TIBC 261 2020    LABIRON 29 2020     Lab Results   Component Value Date    OMAKSLKH14 336 2020    FOLATE 10.19 2020     Lab Results   Component Value Date    VITD25 14.1 2020     Lab Results   Component Value Date    LABA1C 5.0 2020    EAG 96.8 2020       Patient Active Problem List   Diagnosis    Previous  section-Primary low transverse via pfannenstiel incision    Obesity    Localized swelling, mass, or lump of lower extremity    Anemia complicating pregnancy in second trimester    Polyhydramnios    GBS (group B Streptococcus carrier), +RV culture, currently pregnant     delivery delivered    Chronic GERD    Chronic midline low back pain without sciatica    Arthritis of knee, right    Vitamin D deficiency    Mixed hyperlipidemia    Morbid obesity with BMI of 40.0-44.9, adult (HCC)    High risk medications (not anticoagulants) long-term use       Review of Systems   Constitutional: Negative. Negative for chills, fatigue and fever. HENT: Negative. Eyes: Negative. Respiratory: Negative. Negative for cough and shortness of breath. Cardiovascular: Negative. Gastrointestinal: Negative. Negative for constipation (Improved with Colace and improved fluid intake. ). Endocrine: Negative. Genitourinary: Negative. Musculoskeletal: Negative. Skin: Negative. Allergic/Immunologic: Negative. Neurological: Positive for headaches (Recently increased attributed to stress). Negative for dizziness and numbness. Hematological: Negative. Psychiatric/Behavioral: Negative. Negative for sleep disturbance. Physical Exam  Vitals signs reviewed. Constitutional:       Appearance: She is well-developed. HENT:      Head: Normocephalic and atraumatic. Eyes:      Conjunctiva/sclera: Conjunctivae normal.      Pupils: Pupils are equal, round, and reactive to light. Neck:      Musculoskeletal: Normal range of motion and neck supple. Cardiovascular:      Rate and Rhythm: Normal rate. Pulmonary:      Effort: Pulmonary effort is normal.   Abdominal:      Palpations: Abdomen is soft. Musculoskeletal: Normal range of motion. Skin:     General: Skin is warm and dry. Neurological:      Mental Status: She is alert and oriented to person, place, and time. Psychiatric:         Behavior: Behavior normal.         Thought Content: Thought content normal.         Judgment: Judgment normal.       Assessment and Plan:    Patient is here for their 10th medical weight loss visit, no change in weight and a total weight loss of 26 lbs. The patient's current Body mass index is 41.44 kg/m². (3/8/21). She is doing ok, making dietary and behavior modifications. She is following dietary recommendations utilizing the 1200 calorie LCMP. A little off track with routine due to a death in the family. Needs to reduce fluids a little. She did meet with the registered dietitian for continued follow up. I agree with recommendations and plan. She is exercising with challenges at home and walking weather permitting. Encouraged continued physical activity. Discussed utilizing OTC option for now for headaches, as they help, if headaches increase in frequency and/or OTC medications no longer helping follow up with PCP. Discussed risks, benefits and alternatives of Qsymia. Patient meets BMI criteria, confirms negative pregnancy status monthly, denies any significant coronary artery disease, glaucoma,hyperthyroidism, MAOIs within the past 14 days and no known hypersensitivity to the sympathomimetic amines, kidney or liver impairment. EKG previously completed and reviewed. OARRS report completed and reviewed today. Patient aware that this medication is not covered by insurance. Explained to patient that we will monitor their weight loss every 12 weeks and if they have not lost at least 5% of their body weight we will either look to increase the medication or discontinue. If you achieve the 5% weight loss and are taking the 7.5mg/46mg dose we will continue at the same dose and evaluate again in 12 weeks. If you do not achieve the 5% weight loss we can choose to discontinue the medication or increase your dose to the 11.25 mg/69mg for 2 weeks and then increase to the maximum dose of 15 mg/92mg and monitor your weight loss over the next 12 weeks. If you do not achieve the 5% weight loss over the next 12 weeks we will have to discontinue the medication. Counseled patient on proper use and potential side effects. Explained to patient that the maximum dose of this medication will need to be tapered when she is ready to discontinue it.  she understands that abrupt cessation of this dose may cause adverse reactions including seizures. she understands that it is her responsibility to make sure that she does not run out of medications and to follow up to her appointments every 24 weeks as recommended. Heavily counseled on the importance of therapeutic lifestyle changes through diet and exercise. The patient's current weight is 249 lbs. The goal for the patient in the 12 weeks is a loss of 7.5-12.5 lbs by their April 2021 visit (Starting weight for this goal is 253 lbs). Currently the patient has lost 4 lbs of their 12 week goal.     Discussed possible side effects including, but not limited to palpitations, irritability, paresthesias, dizziness, dysgeusia, insomnia, constipation and dry mouth. Patient is responsible for keeping their monthly appointments. Failure to comply with their monthly visits will result in discontinuing the Qsymia. The patient also understands that if they are off of the medication for 7 days, the medication will be discontinued and cannot be restarted for six months. The patient understands they are not to drink alcohol while on this medication. Females, it is your responsibility to obtain negative pregnancy tests each month. Discussed with patient to make sure they fill their prescription within at least 7 days of this appointment. We will see her back in 1 month for continued follow up. Hyperlipidemia:   [x] Continue to make dietary and lifestyle modifications. [] Continue to follow up with their PCP for medication management and monitoring. Chronic GERD:   [x] Continue to make dietary and lifestyle modifications. [] Continue PPI. [] Continue H2 Blocker  [] Wean PPI. Take every other day for two weeks. If no issues with heartburn/reflux you may decrease to every third day for two weeks. If no issues with heartburn/reflux you may stop the Prilosec. Recommend that you get OTC Pepcid to take should you have occasional heartburn/reflux.     Obesity:  [x] Continue to make dietary and lifestyle modifications. Total encounter time: 32 minutes, including any number of the following: Bariatric medication protocols, review of labs, imaging, provider notes, outside hospital records, performing examination/evaluation, counseling patient and/or family, ordering medications/tests, placing referrals and communication with referring physicians, coordination of care; discussing exercise and physical activity; discussing dietary plan/recall with the patient as well with registered dietitian and documentation in the EHR. Of note, the above was done during same day of the actual patient encounter.

## 2021-03-04 NOTE — PATIENT INSTRUCTIONS
broiling, or grilling meats add flavor without unhealthy fats. Using cooking oil spray and spray butter products are also healthy options that will aid in your weight loss. Foods high in added sugars are often also high in calories and low in nutrients. Focusing on healthier eating habits will help you manage your weight long-term. Everyone's eating habits are often so ingrained that it can be difficult to change. It is important to maintain consistency when changing behaviors for them to become your new normal way of doing things. It is going to be challenging, but you wouldn't be here if you didn't want to change. Remember that overall health, age, and genetics make each person's weight loss progress different. Do not compare your progress to other patients and make sure you are following our recommendations for long-term success. Patient received dietary handouts and education.

## 2021-03-08 ENCOUNTER — OFFICE VISIT (OUTPATIENT)
Dept: BARIATRICS/WEIGHT MGMT | Age: 36
End: 2021-03-08
Payer: COMMERCIAL

## 2021-03-08 VITALS
DIASTOLIC BLOOD PRESSURE: 88 MMHG | HEART RATE: 83 BPM | TEMPERATURE: 97.1 F | RESPIRATION RATE: 16 BRPM | OXYGEN SATURATION: 98 % | WEIGHT: 249 LBS | BODY MASS INDEX: 41.48 KG/M2 | HEIGHT: 65 IN | SYSTOLIC BLOOD PRESSURE: 136 MMHG

## 2021-03-08 DIAGNOSIS — E78.2 MIXED HYPERLIPIDEMIA: ICD-10-CM

## 2021-03-08 DIAGNOSIS — E66.01 MORBID OBESITY WITH BMI OF 40.0-44.9, ADULT (HCC): ICD-10-CM

## 2021-03-08 DIAGNOSIS — Z79.899 HIGH RISK MEDICATIONS (NOT ANTICOAGULANTS) LONG-TERM USE: ICD-10-CM

## 2021-03-08 DIAGNOSIS — K21.9 CHRONIC GERD: Primary | ICD-10-CM

## 2021-03-08 PROCEDURE — 99214 OFFICE O/P EST MOD 30 MIN: CPT | Performed by: NURSE PRACTITIONER

## 2021-03-08 PROCEDURE — G8484 FLU IMMUNIZE NO ADMIN: HCPCS | Performed by: NURSE PRACTITIONER

## 2021-03-08 PROCEDURE — G8417 CALC BMI ABV UP PARAM F/U: HCPCS | Performed by: NURSE PRACTITIONER

## 2021-03-08 PROCEDURE — G8427 DOCREV CUR MEDS BY ELIG CLIN: HCPCS | Performed by: NURSE PRACTITIONER

## 2021-03-08 PROCEDURE — 1036F TOBACCO NON-USER: CPT | Performed by: NURSE PRACTITIONER

## 2021-03-08 RX ORDER — PHENTERMINE AND TOPIRAMATE 7.5; 46 MG/1; MG/1
1 CAPSULE, EXTENDED RELEASE ORAL DAILY
Qty: 30 CAPSULE | Refills: 0 | Status: SHIPPED | OUTPATIENT
Start: 2021-03-08 | End: 2021-04-07

## 2021-03-08 ASSESSMENT — ENCOUNTER SYMPTOMS: CONSTIPATION: 0

## 2021-04-06 ASSESSMENT — ENCOUNTER SYMPTOMS
CONSTIPATION: 0
EYES NEGATIVE: 1
GASTROINTESTINAL NEGATIVE: 1
RESPIRATORY NEGATIVE: 1
ALLERGIC/IMMUNOLOGIC NEGATIVE: 1

## 2021-04-06 NOTE — PROGRESS NOTES
[facility]) Physicians   [employer] [facility] [employer]    Medical Weight Loss    HPI: Dylan Leija is a 28 y.o. female with Body mass index is 40.94 kg/m². Here for medical weight loss. Patient denies any nausea, vomiting, fevers, chills, shortness of breath, chest pain, cough, constipation or difficulty urinating. Past Medical History:   Diagnosis Date    Anemia     taking iron    Back pain     Preeclampsia     UTI (lower urinary tract infection)     Vitamin D deficiency      Past Surgical History:   Procedure Laterality Date    ADENOIDECTOMY       SECTION      NRFHT    LEG SURGERY      bone taken from right hip to surgically repair fracture to RLE.    TONSILLECTOMY       Family History   Problem Relation Age of Onset    Asthma Mother     Diabetes Mother     Diabetes Maternal Aunt     High Blood Pressure Maternal Aunt     Diabetes Maternal Grandmother     Hypertension Father     Diabetes Father      Social History     Tobacco Use    Smoking status: Never Smoker    Smokeless tobacco: Never Used   Substance Use Topics    Alcohol use: No     Comment: occas     I counseled the patient on the importance of not smoking and risks of ETOH. No Known Allergies  Vitals:    21 0820   BP: 124/88   Pulse: 84   Resp: 16   SpO2: 98%   Weight: 246 lb (111.6 kg)   Height: 5' 5\" (1.651 m)     Body mass index is 40.94 kg/m². Current Outpatient Medications:     vitamin C (ASCORBIC ACID) 500 MG tablet, Take 1 tablet by mouth daily, Disp: 30 tablet, Rfl: 3    levonorgestrel (MIRENA, 52 MG,) IUD 52 mg, 1 each by Intrauterine route once, Disp: , Rfl:     ferrous sulfate (FE TABS) 325 (65 FE) MG EC tablet, Take 1 tablet by mouth 2 times daily. , Disp: 60 tablet, Rfl: 11    Lab Results   Component Value Date    WBC 5.3 2020    RBC 4.64 2020    HGB 13.7 2020    HCT 41.4 2020    MCV 89.3 2020    MCH 29.6 2020    MCHC 33.2 2020    MPV 9.3 2020 NEUTOPHILPCT 63.0 2020    LYMPHOPCT 23.6 2020    MONOPCT 10.1 2020    EOSRELPCT 2.5 2020    BASOPCT 0.8 2020    NEUTROABS 3.3 2020    LYMPHSABS 1.2 2020    MONOSABS 0.5 2020    EOSABS 0.1 2020     Lab Results   Component Value Date     2020    K 4.2 2020     2020    CO2 22 2020    ANIONGAP 13 2020    GLUCOSE 70 2020    BUN 12 2020    CREATININE 0.7 2020    LABGLOM >60 2020    GFRAA >60 2020    GFRAA >60 2012    CALCIUM 9.5 2020    PROT 7.5 2020    LABALBU 4.2 2020    AGRATIO 1.3 2020    BILITOT 0.5 2020    ALKPHOS 78 2020    ALT 14 2020    AST 15 2020    GLOB 3.3 2020     Lab Results   Component Value Date    CHOL 221 2020    TRIG 42 2020    HDL 68 2020    LDLCALC 145 2020    LABVLDL 8 2020     Lab Results   Component Value Date    TSHREFLEX 1.99 2020     Lab Results   Component Value Date    IRON 76 2020    TIBC 261 2020    LABIRON 29 2020     Lab Results   Component Value Date    JCXFVODJ69 336 2020    FOLATE 10.19 2020     Lab Results   Component Value Date    VITD25 14.1 2020     Lab Results   Component Value Date    LABA1C 5.0 2020    EAG 96.8 2020       Patient Active Problem List   Diagnosis    Previous  section-Primary low transverse via pfannenstiel incision    Obesity    Localized swelling, mass, or lump of lower extremity    Anemia complicating pregnancy in second trimester    Polyhydramnios    GBS (group B Streptococcus carrier), +RV culture, currently pregnant     delivery delivered    Chronic GERD    Chronic midline low back pain without sciatica    Arthritis of knee, right    Vitamin D deficiency    Mixed hyperlipidemia    Morbid obesity with BMI of 40.0-44.9, adult ([facility] [address]    High risk medications (not anticoagulants) long-term use       Review of Systems   Constitutional: Negative. HENT: Negative. Eyes: Negative. Respiratory: Negative. Cardiovascular: Negative. Gastrointestinal: Negative. Negative for constipation. Endocrine: Negative. Genitourinary: Negative. Musculoskeletal: Negative. Skin: Negative. Allergic/Immunologic: Negative. Neurological: Negative. Negative for dizziness, numbness and headaches. Hematological: Negative. Psychiatric/Behavioral: Negative. Negative for sleep disturbance. Physical Exam  Vitals signs reviewed. Constitutional:       Appearance: She is well-developed. HENT:      Head: Normocephalic and atraumatic. Eyes:      Conjunctiva/sclera: Conjunctivae normal.      Pupils: Pupils are equal, round, and reactive to light. Neck:      Musculoskeletal: Normal range of motion and neck supple. Cardiovascular:      Rate and Rhythm: Normal rate. Pulmonary:      Effort: Pulmonary effort is normal.   Abdominal:      Palpations: Abdomen is soft. Musculoskeletal: Normal range of motion. Skin:     General: Skin is warm and dry. Neurological:      Mental Status: She is alert and oriented to person, place, and time. Psychiatric:         Behavior: Behavior normal.         Thought Content: Thought content normal.         Judgment: Judgment normal.       Assessment and Plan:    Patient is here for their 11th medical weight loss visit, down 3 lbs and a total weight loss of 29 lbs. The patient's current Body mass index is 40.94 kg/m². (4/12/21). She is doing well, making dietary and behavior modifications. She is following dietary recommendations utilizing the 1200 calorie LCMP, but needs to be more consistent with reaching that 1200 calories and eating breakfast daily. She did meet with the registered dietitian for continued follow up. I agree with recommendations and plan. She is exercising with walking and elliptical 5-6 days per week. Encouraged continued physical activity. Discussed risks, benefits and alternatives of Qsymia. Patient meets BMI criteria, confirms negative pregnancy status monthly, denies any significant coronary artery disease, glaucoma,hyperthyroidism, MAOIs within the past 14 days and no known hypersensitivity to the sympathomimetic amines, kidney or liver impairment. EKG previously completed and reviewed. OARRS report completed and reviewed today. Patient aware that this medication is not covered by insurance. Explained to patient that we will monitor their weight loss every 12 weeks and if they have not lost at least 5% of their body weight we will either look to increase the medication or discontinue. If you achieve the 5% weight loss and are taking the 7.5mg/46mg dose we will continue at the same dose and evaluate again in 12 weeks. If you do not achieve the 5% weight loss we can choose to discontinue the medication or increase your dose to the 11.25 mg/69mg for 2 weeks and then increase to the maximum dose of 15 mg/92mg and monitor your weight loss over the next 12 weeks. If you do not achieve the 5% weight loss over the next 12 weeks we will have to discontinue the medication. Counseled patient on proper use and potential side effects. Explained to patient that the maximum dose of this medication will need to be tapered when she is ready to discontinue it. she understands that abrupt cessation of this dose may cause adverse reactions including seizures. she understands that it is her responsibility to make sure that she does not run out of medications and to follow up to her appointments every 24 weeks as recommended. Heavily counseled on the importance of therapeutic lifestyle changes through diet and exercise. The patient's current weight is 246 lbs. The goal for the patient in the 12 weeks is a loss of 7.5-12.5 lbs by this visit (Starting weight for this goal is 253 lbs).  Currently the patient has lost 7 lbs of their 12 week goal. The goal for the patient in the next 12 weeks is a loss of 7-12 lbs by their July 2021 visit (Starting weight for this goal is 246 lbs). Discussed possible side effects including, but not limited to palpitations, irritability, paresthesias, dizziness, dysgeusia, insomnia, constipation and dry mouth. Patient is responsible for keeping their monthly appointments. Failure to comply with their monthly visits will result in discontinuing the Qsymia. The patient also understands that if they are off of the medication for 7 days, the medication will be discontinued and cannot be restarted for six months. The patient understands they are not to drink alcohol while on this medication. Females, it is your responsibility to obtain negative pregnancy tests each month. Discussed with patient to make sure they fill their prescription within at least 7 days of this appointment. We will see her back in 1 month for continued follow up. Hyperlipidemia:   [x] Continue to make dietary and lifestyle modifications per our recommendations. [] Continue to follow up with their PCP for medication management and monitoring. Chronic GERD:   [x] Continue to make dietary and lifestyle modifications per our recommendations. [] Continue PPI. [] Continue H2 Blocker  [] Wean PPI. Take every other day for two weeks. If no issues with heartburn/reflux you may decrease to every third day for two weeks. If no issues with heartburn/reflux you may stop the Prilosec. Recommend that you get OTC Pepcid to take should you have occasional heartburn/reflux. Obesity:  [x] Continue to make dietary and lifestyle modifications per our recommendations.     Total encounter time: 22 minutes, including any number of the following: Bariatric medication protocols, review of labs, imaging, provider notes, outside hospital records, performing examination/evaluation, counseling patient and/or family, ordering medications/tests, placing referrals and communication with referring physicians, coordination of care; discussing exercise and physical activity; discussing dietary plan/recall with the patient as well with registered dietitian and documentation in the EHR. Of note, the above was done during same day of the actual patient encounter.

## 2021-04-06 NOTE — PATIENT INSTRUCTIONS
broiling, or grilling meats add flavor without unhealthy fats. Using cooking oil spray and spray butter products are also healthy options that will aid in your weight loss. Foods high in added sugars are often also high in calories and low in nutrients. Focusing on healthier eating habits will help you manage your weight long-term. Everyone's eating habits are often so ingrained that it can be difficult to change. It is important to maintain consistency when changing behaviors for them to become your new normal way of doing things. It is going to be challenging, but you wouldn't be here if you didn't want to change. Remember that overall health, age, and genetics make each person's weight loss progress different. Do not compare your progress to other patients and make sure you are following our recommendations for long-term success. The patient's current weight is 246 lbs. The goal for the patient in the 12 weeks is a loss of 7.5-12.5 lbs by this visit (Starting weight for this goal is 253 lbs). Currently the patient has lost 7 lbs of their 12 week goal. The goal for the patient in the next 12 weeks is a loss of 7-12 lbs by their July 2021 visit (Starting weight for this goal is 246 lbs). Discussed possible side effects including, but not limited to palpitations, irritability, paresthesias, dizziness, dysgeusia, insomnia, constipation and dry mouth. Patient received dietary handouts and education.

## 2021-04-12 ENCOUNTER — OFFICE VISIT (OUTPATIENT)
Dept: BARIATRICS/WEIGHT MGMT | Age: 36
End: 2021-04-12
Payer: COMMERCIAL

## 2021-04-12 VITALS
DIASTOLIC BLOOD PRESSURE: 88 MMHG | SYSTOLIC BLOOD PRESSURE: 124 MMHG | WEIGHT: 246 LBS | OXYGEN SATURATION: 98 % | HEART RATE: 84 BPM | HEIGHT: 65 IN | RESPIRATION RATE: 16 BRPM | BODY MASS INDEX: 40.98 KG/M2

## 2021-04-12 DIAGNOSIS — Z79.899 HIGH RISK MEDICATIONS (NOT ANTICOAGULANTS) LONG-TERM USE: ICD-10-CM

## 2021-04-12 DIAGNOSIS — K21.9 CHRONIC GERD: Primary | ICD-10-CM

## 2021-04-12 DIAGNOSIS — E66.01 MORBID OBESITY WITH BMI OF 40.0-44.9, ADULT (HCC): ICD-10-CM

## 2021-04-12 DIAGNOSIS — E78.2 MIXED HYPERLIPIDEMIA: ICD-10-CM

## 2021-04-12 PROCEDURE — 1036F TOBACCO NON-USER: CPT | Performed by: NURSE PRACTITIONER

## 2021-04-12 PROCEDURE — 99213 OFFICE O/P EST LOW 20 MIN: CPT | Performed by: NURSE PRACTITIONER

## 2021-04-12 PROCEDURE — G8427 DOCREV CUR MEDS BY ELIG CLIN: HCPCS | Performed by: NURSE PRACTITIONER

## 2021-04-12 PROCEDURE — G8417 CALC BMI ABV UP PARAM F/U: HCPCS | Performed by: NURSE PRACTITIONER

## 2021-04-12 RX ORDER — PHENTERMINE AND TOPIRAMATE 7.5; 46 MG/1; MG/1
1 CAPSULE, EXTENDED RELEASE ORAL DAILY
Qty: 30 CAPSULE | Refills: 0 | Status: SHIPPED | OUTPATIENT
Start: 2021-04-12 | End: 2021-05-12

## 2021-06-24 ENCOUNTER — TELEPHONE (OUTPATIENT)
Dept: INTERNAL MEDICINE CLINIC | Age: 36
End: 2021-06-24

## 2021-06-24 NOTE — TELEPHONE ENCOUNTER
----- Message from Kimber Ryne sent at 6/24/2021 12:55 PM EDT -----  Subject: Message to Provider    QUESTIONS  Information for Provider? Pt is calling to schedule a routine check up   visit but no appt avail with Dr. Neema Drake until Sept. Pt is requesting her   visit to be during the week of July 5th or July 12th and is willing to see   another provider at the practice. Pt also needs to schedule school   physicals for both her children Autumn Fletcher 8/13/08 [id number] and Satnam Navarro 9/5/2012 [id number]. These physicals need to be completed before mid August. Please call to schedule.   ---------------------------------------------------------------------------  --------------  CALL BACK INFO  What is the best way for the office to contact you? OK to leave message on   voicemail  Preferred Call Back Phone Number? [phone]  ---------------------------------------------------------------------------  --------------  SCRIPT ANSWERS  Relationship to Patient?  Self

## 2021-06-24 NOTE — TELEPHONE ENCOUNTER
Please sched next available with Dr Steve Meza  For [facility]  30 min each and Mom appt    Thanks   Will have to do separate days

## 2021-06-29 NOTE — TELEPHONE ENCOUNTER
appts need to be sched by          Well child visits will need next available      Ay be a few months before they can be seen together

## 2021-07-01 NOTE — TELEPHONE ENCOUNTER
Left message informing mom that currently we do not have well child visit available and if she needs both children on the same day it may be several months.   Requested a call back

## 2021-07-06 ENCOUNTER — TELEPHONE (OUTPATIENT)
Dept: INTERNAL MEDICINE CLINIC | Age: 36
End: 2021-07-06

## 2024-05-02 ENCOUNTER — OFFICE VISIT (OUTPATIENT)
Age: 39
End: 2024-05-02

## 2024-05-02 VITALS — WEIGHT: 246 LBS | BODY MASS INDEX: 39.53 KG/M2 | RESPIRATION RATE: 17 BRPM | HEIGHT: 66 IN

## 2024-05-02 DIAGNOSIS — J01.00 ACUTE NON-RECURRENT MAXILLARY SINUSITIS: Primary | ICD-10-CM

## 2024-05-02 RX ORDER — AMOXICILLIN AND CLAVULANATE POTASSIUM 875; 125 MG/1; MG/1
1 TABLET, FILM COATED ORAL 2 TIMES DAILY
Qty: 14 TABLET | Refills: 0 | Status: SHIPPED | OUTPATIENT
Start: 2024-05-02 | End: 2024-05-09

## 2024-05-02 RX ORDER — METHYLPREDNISOLONE 4 MG/1
TABLET ORAL
Qty: 21 TABLET | Refills: 0 | Status: SHIPPED | OUTPATIENT
Start: 2024-05-02 | End: 2024-05-08

## 2024-05-02 RX ORDER — BENZONATATE 200 MG/1
200 CAPSULE ORAL 3 TIMES DAILY PRN
Qty: 15 CAPSULE | Refills: 0 | Status: SHIPPED | OUTPATIENT
Start: 2024-05-02 | End: 2024-05-07

## 2024-05-02 ASSESSMENT — ENCOUNTER SYMPTOMS
COUGH: 1
SINUS PRESSURE: 1
SHORTNESS OF BREATH: 0
SWOLLEN GLANDS: 0
HOARSE VOICE: 1
SORE THROAT: 1

## 2024-05-02 NOTE — PATIENT INSTRUCTIONS
Begin treatment for probable Bacterial Sinusitis based on symptoms and exam.  Duration > 10 days, Double worsening symptoms, Facial pain.  AUGMENTIN BID 5-7 days, Medrol dose pack  Cough medication Tessalon to use as needed and directed up to 3x a day.  Review printed materials.  Continue rest, hydration, and decongestants.  Nasal saline rinses, steam, vapo rub can also help.  Return if symptoms change or worsen for re-evaluation.

## 2024-05-02 NOTE — PROGRESS NOTES
Rosalee Blake (:  1985) is a 38 y.o. female,New patient, here for evaluation of the following chief complaint(s):  Sinusitis (Has tried everything over the counter and nothing is working )      ASSESSMENT/PLAN:  Visit Diagnoses and Associated Orders       Acute non-recurrent maxillary sinusitis    -  Primary    amoxicillin-clavulanate (AUGMENTIN) 875-125 MG per tablet [54370]      methylPREDNISolone (MEDROL DOSEPACK) 4 MG tablet [3731]      benzonatate (TESSALON) 200 MG capsule [99382]                    Begin treatment for probable Bacterial Sinusitis based on symptoms and exam.  Duration > 10 days, Double worsening symptoms, Facial pain.  AUGMENTIN BID 5-7 days, Medrol dose pack  Cough medication Tessalon to use as needed and directed up to 3x a day.  Review printed materials.  Continue rest, hydration, and decongestants.  Nasal saline rinses, steam, vapo rub can also help.  Return if symptoms change or worsen for re-evaluation.     SUBJECTIVE/OBJECTIVE:      History provided by:  Patient   used: No    Sinusitis  This is a new problem. The current episode started 1 to 4 weeks ago. The problem has been rapidly worsening since onset. There has been no fever. Associated symptoms include congestion, coughing, headaches, a hoarse voice, sinus pressure and a sore throat. Pertinent negatives include no chills, diaphoresis, ear pain, neck pain, shortness of breath, sneezing or swollen glands. Past treatments include oral decongestants. The treatment provided mild relief.       ROS: See HPI       Vitals:    24 1209   Resp: 17   Weight: 111.6 kg (246 lb)   Height: 1.676 m (5' 6\")       No results found for this visit on 24.     Physical Exam  Vitals and nursing note reviewed.   Constitutional:       General: She is not in acute distress.     Appearance: She is not diaphoretic.   HENT:      Right Ear: Tympanic membrane, ear canal and external ear normal.      Left Ear: Tympanic

## 2024-08-11 ENCOUNTER — OFFICE VISIT (OUTPATIENT)
Age: 39
End: 2024-08-11

## 2024-08-11 VITALS
WEIGHT: 293 LBS | HEIGHT: 66 IN | TEMPERATURE: 98.6 F | DIASTOLIC BLOOD PRESSURE: 115 MMHG | OXYGEN SATURATION: 95 % | SYSTOLIC BLOOD PRESSURE: 166 MMHG | RESPIRATION RATE: 18 BRPM | BODY MASS INDEX: 47.09 KG/M2 | HEART RATE: 93 BPM

## 2024-08-11 DIAGNOSIS — U07.1 COVID-19: Primary | ICD-10-CM

## 2024-08-11 DIAGNOSIS — R09.81 CONGESTION OF NASAL SINUS: ICD-10-CM

## 2024-08-11 LAB
Lab: ABNORMAL
QC PASS/FAIL: ABNORMAL
SARS-COV-2 RDRP RESP QL NAA+PROBE: POSITIVE
STREPTOCOCCUS A RNA: NORMAL

## 2024-08-11 RX ORDER — BENZONATATE 100 MG/1
100 CAPSULE ORAL 3 TIMES DAILY PRN
Qty: 30 CAPSULE | Refills: 0 | Status: SHIPPED | OUTPATIENT
Start: 2024-08-11 | End: 2024-08-21

## 2024-08-11 RX ORDER — METHYLPREDNISOLONE 4 MG/1
TABLET ORAL
Qty: 21 TABLET | Refills: 0 | Status: SHIPPED | OUTPATIENT
Start: 2024-08-11 | End: 2024-08-17